# Patient Record
Sex: MALE | Race: WHITE | NOT HISPANIC OR LATINO | Employment: OTHER | URBAN - METROPOLITAN AREA
[De-identification: names, ages, dates, MRNs, and addresses within clinical notes are randomized per-mention and may not be internally consistent; named-entity substitution may affect disease eponyms.]

---

## 2019-01-02 ENCOUNTER — OFFICE VISIT CONVERTED (OUTPATIENT)
Dept: FAMILY MEDICINE CLINIC | Facility: CLINIC | Age: 59
End: 2019-01-02
Attending: FAMILY MEDICINE

## 2019-09-11 ENCOUNTER — CONVERSION ENCOUNTER (OUTPATIENT)
Dept: FAMILY MEDICINE CLINIC | Facility: CLINIC | Age: 59
End: 2019-09-11

## 2019-09-11 ENCOUNTER — OFFICE VISIT CONVERTED (OUTPATIENT)
Dept: FAMILY MEDICINE CLINIC | Facility: CLINIC | Age: 59
End: 2019-09-11
Attending: FAMILY MEDICINE

## 2019-09-12 ENCOUNTER — HOSPITAL ENCOUNTER (OUTPATIENT)
Dept: FAMILY MEDICINE CLINIC | Facility: CLINIC | Age: 59
Discharge: HOME | End: 2019-09-12
Attending: FAMILY MEDICINE

## 2019-09-12 LAB
ALBUMIN SERPL-MCNC: 4.7 G/DL (ref 3.5–5)
ALBUMIN/GLOB SERPL: 1.9 {RATIO} (ref 1.4–2.6)
ALP SERPL-CCNC: 86 U/L (ref 56–119)
ALT SERPL-CCNC: 46 U/L (ref 10–40)
ANION GAP SERPL CALC-SCNC: 24 MMOL/L (ref 8–19)
AST SERPL-CCNC: 27 U/L (ref 15–50)
BASOPHILS # BLD AUTO: 0.02 10*3/UL (ref 0–0.2)
BASOPHILS NFR BLD AUTO: 0.4 % (ref 0–3)
BILIRUB SERPL-MCNC: 0.45 MG/DL (ref 0.2–1.3)
BUN SERPL-MCNC: 17 MG/DL (ref 5–25)
BUN/CREAT SERPL: 18 {RATIO} (ref 6–20)
CALCIUM SERPL-MCNC: 9.9 MG/DL (ref 8.7–10.4)
CHLORIDE SERPL-SCNC: 106 MMOL/L (ref 99–111)
CHOLEST SERPL-MCNC: 112 MG/DL (ref 107–200)
CHOLEST/HDLC SERPL: 2.9 {RATIO} (ref 3–6)
CONV ABS IMM GRAN: 0.01 10*3/UL (ref 0–0.2)
CONV CO2: 17 MMOL/L (ref 22–32)
CONV IMMATURE GRAN: 0.2 % (ref 0–1.8)
CONV TOTAL PROTEIN: 7.2 G/DL (ref 6.3–8.2)
CREAT UR-MCNC: 0.94 MG/DL (ref 0.7–1.2)
DEPRECATED RDW RBC AUTO: 42.4 FL (ref 35.1–43.9)
EOSINOPHIL # BLD AUTO: 0.11 10*3/UL (ref 0–0.7)
EOSINOPHIL # BLD AUTO: 2 % (ref 0–7)
ERYTHROCYTE [DISTWIDTH] IN BLOOD BY AUTOMATED COUNT: 12.8 % (ref 11.6–14.4)
EST. AVERAGE GLUCOSE BLD GHB EST-MCNC: 111 MG/DL
GFR SERPLBLD BASED ON 1.73 SQ M-ARVRAT: >60 ML/MIN/{1.73_M2}
GLOBULIN UR ELPH-MCNC: 2.5 G/DL (ref 2–3.5)
GLUCOSE SERPL-MCNC: 85 MG/DL (ref 70–99)
HBA1C MFR BLD: 5.5 % (ref 3.5–5.7)
HCT VFR BLD AUTO: 39.1 % (ref 42–52)
HDLC SERPL-MCNC: 38 MG/DL (ref 40–60)
HGB BLD-MCNC: 13.5 G/DL (ref 14–18)
LDLC SERPL CALC-MCNC: 56 MG/DL (ref 70–100)
LYMPHOCYTES # BLD AUTO: 2.07 10*3/UL (ref 1–5)
LYMPHOCYTES NFR BLD AUTO: 37 % (ref 20–45)
MCH RBC QN AUTO: 31.2 PG (ref 27–31)
MCHC RBC AUTO-ENTMCNC: 34.5 G/DL (ref 33–37)
MCV RBC AUTO: 90.3 FL (ref 80–96)
MONOCYTES # BLD AUTO: 0.63 10*3/UL (ref 0.2–1.2)
MONOCYTES NFR BLD AUTO: 11.3 % (ref 3–10)
NEUTROPHILS # BLD AUTO: 2.75 10*3/UL (ref 2–8)
NEUTROPHILS NFR BLD AUTO: 49.1 % (ref 30–85)
NRBC CBCN: 0 % (ref 0–0.7)
OSMOLALITY SERPL CALC.SUM OF ELEC: 297 MOSM/KG (ref 273–304)
PLATELET # BLD AUTO: 258 10*3/UL (ref 130–400)
PMV BLD AUTO: 10.8 FL (ref 9.4–12.4)
POTASSIUM SERPL-SCNC: 4 MMOL/L (ref 3.5–5.3)
PSA SERPL-MCNC: 2.81 NG/ML (ref 0–4)
RBC # BLD AUTO: 4.33 10*6/UL (ref 4.7–6.1)
SODIUM SERPL-SCNC: 143 MMOL/L (ref 135–147)
TRIGL SERPL-MCNC: 92 MG/DL (ref 40–150)
VLDLC SERPL-MCNC: 18 MG/DL (ref 5–37)
WBC # BLD AUTO: 5.59 10*3/UL (ref 4.8–10.8)

## 2019-09-27 ENCOUNTER — CONVERSION ENCOUNTER (OUTPATIENT)
Dept: GASTROENTEROLOGY | Facility: CLINIC | Age: 59
End: 2019-09-27
Attending: INTERNAL MEDICINE

## 2019-10-22 ENCOUNTER — HOSPITAL ENCOUNTER (OUTPATIENT)
Dept: GASTROENTEROLOGY | Facility: HOSPITAL | Age: 59
Setting detail: HOSPITAL OUTPATIENT SURGERY
Discharge: HOME | End: 2019-10-22
Attending: INTERNAL MEDICINE

## 2019-11-06 ENCOUNTER — OFFICE VISIT CONVERTED (OUTPATIENT)
Dept: OTOLARYNGOLOGY | Facility: CLINIC | Age: 59
End: 2019-11-06
Attending: OTOLARYNGOLOGY

## 2019-12-13 ENCOUNTER — HOSPITAL ENCOUNTER (OUTPATIENT)
Dept: SURGERY | Facility: HOSPITAL | Age: 59
Setting detail: HOSPITAL OUTPATIENT SURGERY
Discharge: HOME | End: 2019-12-13
Attending: OTOLARYNGOLOGY

## 2019-12-20 ENCOUNTER — OFFICE VISIT CONVERTED (OUTPATIENT)
Dept: OTOLARYNGOLOGY | Facility: CLINIC | Age: 59
End: 2019-12-20
Attending: OTOLARYNGOLOGY

## 2019-12-20 ENCOUNTER — CONVERSION ENCOUNTER (OUTPATIENT)
Dept: OTHER | Facility: HOSPITAL | Age: 59
End: 2019-12-20

## 2020-02-14 ENCOUNTER — OFFICE VISIT CONVERTED (OUTPATIENT)
Dept: OTOLARYNGOLOGY | Facility: CLINIC | Age: 60
End: 2020-02-14
Attending: OTOLARYNGOLOGY

## 2020-03-11 ENCOUNTER — OFFICE VISIT CONVERTED (OUTPATIENT)
Dept: FAMILY MEDICINE CLINIC | Facility: CLINIC | Age: 60
End: 2020-03-11
Attending: FAMILY MEDICINE

## 2020-03-11 ENCOUNTER — CONVERSION ENCOUNTER (OUTPATIENT)
Dept: FAMILY MEDICINE CLINIC | Facility: CLINIC | Age: 60
End: 2020-03-11

## 2020-09-14 ENCOUNTER — HOSPITAL ENCOUNTER (OUTPATIENT)
Dept: FAMILY MEDICINE CLINIC | Facility: CLINIC | Age: 60
Discharge: HOME | End: 2020-09-14
Attending: FAMILY MEDICINE

## 2020-09-14 ENCOUNTER — OFFICE VISIT CONVERTED (OUTPATIENT)
Dept: FAMILY MEDICINE CLINIC | Facility: CLINIC | Age: 60
End: 2020-09-14
Attending: FAMILY MEDICINE

## 2020-09-14 ENCOUNTER — CONVERSION ENCOUNTER (OUTPATIENT)
Dept: FAMILY MEDICINE CLINIC | Facility: CLINIC | Age: 60
End: 2020-09-14

## 2020-09-14 LAB
ALBUMIN SERPL-MCNC: 4.8 G/DL (ref 3.5–5)
ALBUMIN/GLOB SERPL: 2 {RATIO} (ref 1.4–2.6)
ALP SERPL-CCNC: 76 U/L (ref 56–155)
ALT SERPL-CCNC: 24 U/L (ref 10–40)
ANION GAP SERPL CALC-SCNC: 20 MMOL/L (ref 8–19)
AST SERPL-CCNC: 17 U/L (ref 15–50)
BASOPHILS # BLD AUTO: 0.01 10*3/UL (ref 0–0.2)
BASOPHILS NFR BLD AUTO: 0.2 % (ref 0–3)
BILIRUB SERPL-MCNC: 0.47 MG/DL (ref 0.2–1.3)
BUN SERPL-MCNC: 12 MG/DL (ref 5–25)
BUN/CREAT SERPL: 13 {RATIO} (ref 6–20)
CALCIUM SERPL-MCNC: 9.9 MG/DL (ref 8.7–10.4)
CHLORIDE SERPL-SCNC: 104 MMOL/L (ref 99–111)
CHOLEST SERPL-MCNC: 141 MG/DL (ref 107–200)
CHOLEST/HDLC SERPL: 2.9 {RATIO} (ref 3–6)
CONV ABS IMM GRAN: 0.02 10*3/UL (ref 0–0.2)
CONV CO2: 21 MMOL/L (ref 22–32)
CONV IMMATURE GRAN: 0.4 % (ref 0–1.8)
CONV TOTAL PROTEIN: 7.2 G/DL (ref 6.3–8.2)
CREAT UR-MCNC: 0.9 MG/DL (ref 0.7–1.2)
DEPRECATED RDW RBC AUTO: 41.8 FL (ref 35.1–43.9)
EOSINOPHIL # BLD AUTO: 0.1 10*3/UL (ref 0–0.7)
EOSINOPHIL # BLD AUTO: 1.8 % (ref 0–7)
ERYTHROCYTE [DISTWIDTH] IN BLOOD BY AUTOMATED COUNT: 12.7 % (ref 11.6–14.4)
FOLATE SERPL-MCNC: 18.9 NG/ML (ref 4.8–20)
GFR SERPLBLD BASED ON 1.73 SQ M-ARVRAT: >60 ML/MIN/{1.73_M2}
GLOBULIN UR ELPH-MCNC: 2.4 G/DL (ref 2–3.5)
GLUCOSE SERPL-MCNC: 130 MG/DL (ref 70–99)
HCT VFR BLD AUTO: 41.1 % (ref 42–52)
HDLC SERPL-MCNC: 48 MG/DL (ref 40–60)
HGB BLD-MCNC: 13.8 G/DL (ref 14–18)
IRON SATN MFR SERPL: 31 % (ref 20–55)
IRON SERPL-MCNC: 115 UG/DL (ref 70–180)
LDLC SERPL CALC-MCNC: 73 MG/DL (ref 70–100)
LYMPHOCYTES # BLD AUTO: 1.72 10*3/UL (ref 1–5)
LYMPHOCYTES NFR BLD AUTO: 31.6 % (ref 20–45)
MCH RBC QN AUTO: 30.6 PG (ref 27–31)
MCHC RBC AUTO-ENTMCNC: 33.6 G/DL (ref 33–37)
MCV RBC AUTO: 91.1 FL (ref 80–96)
MONOCYTES # BLD AUTO: 0.4 10*3/UL (ref 0.2–1.2)
MONOCYTES NFR BLD AUTO: 7.3 % (ref 3–10)
NEUTROPHILS # BLD AUTO: 3.2 10*3/UL (ref 2–8)
NEUTROPHILS NFR BLD AUTO: 58.7 % (ref 30–85)
NRBC CBCN: 0 % (ref 0–0.7)
OSMOLALITY SERPL CALC.SUM OF ELEC: 294 MOSM/KG (ref 273–304)
PLATELET # BLD AUTO: 271 10*3/UL (ref 130–400)
PMV BLD AUTO: 10.3 FL (ref 9.4–12.4)
POTASSIUM SERPL-SCNC: 4.1 MMOL/L (ref 3.5–5.3)
PSA SERPL-MCNC: 2.89 NG/ML (ref 0–4)
RBC # BLD AUTO: 4.51 10*6/UL (ref 4.7–6.1)
SODIUM SERPL-SCNC: 141 MMOL/L (ref 135–147)
TIBC SERPL-MCNC: 376 UG/DL (ref 245–450)
TRANSFERRIN SERPL-MCNC: 263 MG/DL (ref 215–365)
TRIGL SERPL-MCNC: 98 MG/DL (ref 40–150)
VIT B12 SERPL-MCNC: 476 PG/ML (ref 211–911)
VLDLC SERPL-MCNC: 20 MG/DL (ref 5–37)
WBC # BLD AUTO: 5.45 10*3/UL (ref 4.8–10.8)

## 2021-03-15 ENCOUNTER — OFFICE VISIT CONVERTED (OUTPATIENT)
Dept: FAMILY MEDICINE CLINIC | Facility: CLINIC | Age: 61
End: 2021-03-15
Attending: FAMILY MEDICINE

## 2021-03-15 ENCOUNTER — CONVERSION ENCOUNTER (OUTPATIENT)
Dept: FAMILY MEDICINE CLINIC | Facility: CLINIC | Age: 61
End: 2021-03-15

## 2021-03-16 ENCOUNTER — HOSPITAL ENCOUNTER (OUTPATIENT)
Dept: VACCINE CLINIC | Facility: HOSPITAL | Age: 61
Discharge: HOME | End: 2021-03-16
Attending: INTERNAL MEDICINE

## 2021-04-06 ENCOUNTER — HOSPITAL ENCOUNTER (OUTPATIENT)
Dept: VACCINE CLINIC | Facility: HOSPITAL | Age: 61
Discharge: HOME | End: 2021-04-06
Attending: INTERNAL MEDICINE

## 2021-05-13 NOTE — PROGRESS NOTES
Progress Note      Patient Name: Gray Ball   Patient ID: [id number]   Sex: Male   YOB: 1960    Primary Care Provider: Mikey Evans DO   Referring Provider: Mikey Evans DO    Visit Date: September 14, 2020    Provider: Mikey Evans DO   Location: [facility]   Location Address: [address] [place] [address], [address]  [id number]   Location Phone: [phone]          Chief Complaint  · 6 month follow up      History Of Present Illness  Gray Ball is a 59 year old /White male who presents for evaluation and treatment of:      Patient presents for checkup today.  He reports overall doing well.  He does have left hand pain but it is not that bothersome at this time.  It is pain around the thumb area.  He is not interested in any evaluation.  He has had issues with the right before and he went and saw a hand specialist and had this surgically corrected.  He is due for labs.  He has had breakfast this morning.  He does have on occasion elevated LFT.  We will follow-up on this with routine labs.  He has had borderline anemia level so we will check B12, folic acid and iron and TIBC levels.  He is only taking a multivitamin at this time.  He is interested in the flu vaccine today.       Past Medical History  Allergic rhinitis; Anxiety; Arthritis; Broken Bones; Colon cancer screening; Depression; Deviated nasal septum; Ear Pain; Elevated ALT measurement; Hypertriglyceridemia; Hypertrophy of both inferior nasal turbinates; Kidney calculus; Memory loss/Forgetfulness; Migraine Headaches; Nasal crusting; Nasal obstruction; Night sweats; Prostate cancer screening; Psychiatric Care         Past Surgical History  EYE SURGERY; Nasal septoplasty; Rotator Cuff repair; Submucosal resection of both inferior turbinates; Trapeziectomy         Medication List  multivitamin oral tablet         Allergy List  PENICILLINS         Family Medical History  Family history of  "brain cancer; Family history of stroke         Social History  Alcohol (Never); Family History of Substance Use/Abuse; Tobacco (Former)         Immunizations  Name Date Admin   Influenza    Influenza    [id number]          Review of Systems     General: Denies any fever, chills, weight changes, or night sweats  HEENT:  Denies any vision or hearing changes. Denies any neck tenderness. Denies any headaches. Denies nasal congestion  Cardiovascular: Denies any chest pain or palpitations  respiratory: Denies any cough or wheezing. Denies any shortness of breath  Gastrointestinal: Denies any nausea vomiting or diarrhea, Denies constipation  Extremities: Denies any edema  Psychiatric: Denies any changes in mood or affect  Neurologic: Denies any neurologic deficits  skin: Denies any rashes or lesions.  endocrine: Denies any weight loss, fever, night sweats  Musculoskeletal: Left thumb pain       Vitals  Date Time BP Position Site L\R Cuff Size HR RR TEMP (F) WT  HT  BMI kg/m2 BSA m2 O2 Sat HC       09/14/2020 08:05 /66 Sitting    74 - R  97 150lbs 0oz 5'  5\" 24.96 1.77 99 %          Physical Examination     General: AAO 3, no acute distress, pleasant  HEENT: Normocephalic, atraumatic  Cardiovascular: Regular rate and rhythm without appreciable murmur  Respiratory: Clear to auscultation bilaterally no RRW  Gastrointestinal: Soft nontender nondistended with bowel sounds present  extremities: No clubbing, cyanosis or edema  Neurologic: CN II through XII grossly intact   Psychiatric: Normal mood and affect               Assessment  · Anemia     285.9/D64.9  · Need for influenza vaccination     V04.81/Z23  · Left hand pain     729.5/M79.642  · Elevated LFTs     790.6/R79.89    Problems Reconciled  Plan  · Orders  o ACO-39: Current medications updated and reviewed () - - 09/14/2020  o Influenza Vaccine, Fluarix, Quadrivalent, age 6 months and up (57624) - V04.81/Z23 - 09/14/2020   Vaccine - Influenza; Dose: 0.5; Site: " Left Deltoid; Route: Intramuscular; Date: 09/14/2020 08:37:00; Exp: 06/30/2020; Lot: [id number]; Mfg: [employer] pasteur; TradeName: Fluzone Quadrivalent; Administered By: Marie Lacey MA; Comment: Patient tolerated injection well. Left in stable condition.  · Medications  o Medications have been Reconciled  o Transition of Care or Provider Policy  · Instructions  o Handouts were given to patient: Flu vaccine  o Patient was educated/instructed on their diagnosis, treatment and medications prior to discharge from the clinic today.  o Patient instructed to seek medical attention urgently for new or worsening symptoms.  o Call the office with any concerns or questions.  o Plan to monitor at this time. If his left hand pain worsens he is instructed to call or return. I offered an x-ray today however patient declines. I discussed using over-the-counter Voltaren gel. Patient verbalized understanding if he has any other issues or concerns he is instructed to let us know. Labs ordered for today.  · Disposition  o Follow Up in 6 months.            Electronically Signed by: Mikey Evans DO -Author on September 14, 2020 08:51:02 AM

## 2021-05-14 VITALS
SYSTOLIC BLOOD PRESSURE: 106 MMHG | DIASTOLIC BLOOD PRESSURE: 66 MMHG | WEIGHT: 150 LBS | BODY MASS INDEX: 24.99 KG/M2 | HEIGHT: 65 IN | OXYGEN SATURATION: 99 % | HEART RATE: 74 BPM | TEMPERATURE: 97 F

## 2021-05-14 VITALS
WEIGHT: 155 LBS | HEIGHT: 64 IN | DIASTOLIC BLOOD PRESSURE: 78 MMHG | TEMPERATURE: 97.7 F | OXYGEN SATURATION: 100 % | SYSTOLIC BLOOD PRESSURE: 120 MMHG | BODY MASS INDEX: 26.46 KG/M2 | HEART RATE: 66 BPM

## 2021-05-14 NOTE — PROGRESS NOTES
Progress Note      Patient Name: Gray Ball   Patient ID: [id number]   Sex: Male   YOB: 1960    Primary Care Provider: Mikey Evans DO   Referring Provider: Mikey Evans DO    Visit Date: March 15, 2021    Provider: Mikey Evans DO   Location: [facility]   Location Address: [address] [place] [address], [address]  [place], [place]  [id number]   Location Phone: [phone]          Chief Complaint  · check up      History Of Present Illness  Gray Ball is a 60 year old /White male who presents for evaluation and treatment of:      Once today for checkup.  He reports overall doing well.  He is having some left hand pain but describes it as not that bad at this time.  He is having similar issues that he did with the right hand which ultimately required surgery.  He is not interested in further evaluation at this time.  If he changes mind he is instructed to call or return.  Depression screening has been reviewed and it is negative.  His previous labs have been reviewed.  His PSA is unchanged.  Hemoglobin is 13.8.  B12, folic acid and iron levels are adequate.  He is taking a multivitamin.       Past Medical History  Allergic rhinitis; Anxiety; Arthritis; Broken Bones; Colon cancer screening; Depression; Deviated nasal septum; Ear Pain; Elevated ALT measurement; Hypertriglyceridemia; Hypertrophy of both inferior nasal turbinates; Kidney calculus; Memory loss/Forgetfulness; Migraine Headaches; Nasal crusting; Nasal obstruction; Night sweats; Prostate cancer screening; Psychiatric Care         Past Surgical History  EYE SURGERY; Nasal septoplasty; Rotator Cuff repair; Submucosal resection of both inferior turbinates; Trapeziectomy         Medication List  multivitamin oral tablet         Allergy List  PENICILLINS         Family Medical History  Family history of brain cancer; Family history of stroke         Social History  Alcohol (Never); Family History of Substance  "Use/Abuse; Tobacco (Former)         Immunizations  Name Date Admin   Influenza 09/14/2020   Influenza 09/25/2019   [id number] 09/12/2019         Review of Systems     Gen: Denies any fever, chills, or weight changes  Musculoskeletal: Left hand/thumb pain  Extremities: Denies edema  Psychiatric: Depression screening has been reviewed and it is negative.  Neurologic: Denies any deficits  Skin: Denies any rashes       Vitals  Date Time BP Position Site L\R Cuff Size HR RR TEMP (F) WT  HT  BMI kg/m2 BSA m2 O2 Sat FR L/min FiO2 HC       03/15/2021 08:08 /78 Sitting    66 - R  97.7 155lbs 0oz 5'  4\" 26.61 1.78 100 %            Physical Examination     General: AAO 3, no acute distress, pleasant  HEENT: Normocephalic, atraumatic  Cardiovascular: Regular rate and rhythm without appreciable murmur  Respiratory: Clear to auscultation bilaterally no RRW  extremities: No edema  Neurologic: CN II through XII grossly intact   Psychiatric: Normal mood and affect           Assessment  · Anemia     285.9/D64.9  · Screening for depression     V79.0/Z13.89  · Screening for lipid disorders     V77.91/Z13.220  · Screening for prostate cancer     V76.44/Z12.5  · Medication monitoring encounter     V58.83/Z51.81  · Left hand pain     729.5/M79.642      Plan  · Orders  o PSA Ultrasensitive, ANNUAL SCREENING [facility] (19311, ) - V76.44/Z12.5 - 09/15/2021  o CBC with Auto Diff [facility] (20177) - 285.9/D64.9 - 09/15/2021  o CMP [facility] (13180) - V58.83/Z51.81, 285.9/D64.9 - 09/15/2021  o Lipid Panel [facility] (48690) - V77.91/Z13.220 - 09/15/2021  o ACO-18: Negative screen for clinical depression using a standardized tool () - - 03/15/2021   2 points  o ACO-39: Current medications updated and reviewed (, 1159F) - - 03/15/2021  · Medications  o Medications have been Reconciled  o Transition of Care or Provider Policy  · Instructions  o Depression Screen completed and scanned into the EMR under the designated folder within the patient's " documents.  o Today's PHQ-9 result is _2__  o Patient was educated/instructed on their diagnosis, treatment and medications prior to discharge from the clinic today.  o Patient instructed to seek medical attention urgently for new or worsening symptoms.  o Call the office with any concerns or questions.  o Discussed getting labs updated when patient returns in 6 months. He is instructed to call with any questions or concerns. Discussed using Voltaren gel on as-needed basis. Offered a prescription today however patient declines. He states he will get it over-the-counter.  · Disposition  o Follow Up in 6 months.            Electronically Signed by: Mikey Evans DO -Author on March 15, 2021 08:22:44 AM

## 2021-05-15 VITALS
HEIGHT: 65 IN | SYSTOLIC BLOOD PRESSURE: 104 MMHG | HEART RATE: 64 BPM | DIASTOLIC BLOOD PRESSURE: 62 MMHG | BODY MASS INDEX: 25.16 KG/M2 | WEIGHT: 151 LBS | OXYGEN SATURATION: 99 % | TEMPERATURE: 97.5 F

## 2021-05-15 VITALS
OXYGEN SATURATION: 99 % | BODY MASS INDEX: 25.08 KG/M2 | WEIGHT: 150.5 LBS | SYSTOLIC BLOOD PRESSURE: 118 MMHG | HEIGHT: 65 IN | HEART RATE: 87 BPM | TEMPERATURE: 97.8 F | DIASTOLIC BLOOD PRESSURE: 62 MMHG

## 2021-05-15 VITALS
TEMPERATURE: 98.2 F | HEART RATE: 76 BPM | DIASTOLIC BLOOD PRESSURE: 79 MMHG | RESPIRATION RATE: 16 BRPM | SYSTOLIC BLOOD PRESSURE: 113 MMHG | OXYGEN SATURATION: 100 %

## 2021-05-15 VITALS
HEART RATE: 68 BPM | HEIGHT: 65 IN | TEMPERATURE: 97.8 F | WEIGHT: 155.25 LBS | BODY MASS INDEX: 25.87 KG/M2 | RESPIRATION RATE: 15 BRPM | DIASTOLIC BLOOD PRESSURE: 76 MMHG | SYSTOLIC BLOOD PRESSURE: 137 MMHG | OXYGEN SATURATION: 100 %

## 2021-05-15 VITALS
HEART RATE: 71 BPM | OXYGEN SATURATION: 100 % | SYSTOLIC BLOOD PRESSURE: 137 MMHG | HEIGHT: 65 IN | DIASTOLIC BLOOD PRESSURE: 78 MMHG

## 2021-05-16 VITALS
DIASTOLIC BLOOD PRESSURE: 60 MMHG | OXYGEN SATURATION: 99 % | BODY MASS INDEX: 30.58 KG/M2 | HEIGHT: 64 IN | HEART RATE: 86 BPM | SYSTOLIC BLOOD PRESSURE: 112 MMHG | TEMPERATURE: 98.9 F | WEIGHT: 179.12 LBS

## 2021-09-15 ENCOUNTER — OFFICE VISIT (OUTPATIENT)
Dept: FAMILY MEDICINE CLINIC | Facility: CLINIC | Age: 61
End: 2021-09-15

## 2021-09-15 VITALS
HEIGHT: 64 IN | HEART RATE: 70 BPM | OXYGEN SATURATION: 97 % | SYSTOLIC BLOOD PRESSURE: 108 MMHG | WEIGHT: 144.5 LBS | BODY MASS INDEX: 24.67 KG/M2 | DIASTOLIC BLOOD PRESSURE: 82 MMHG | TEMPERATURE: 97.7 F

## 2021-09-15 DIAGNOSIS — Z51.81 MEDICATION MONITORING ENCOUNTER: ICD-10-CM

## 2021-09-15 DIAGNOSIS — D64.9 ANEMIA, UNSPECIFIED TYPE: ICD-10-CM

## 2021-09-15 DIAGNOSIS — R79.89 LOW SERUM LOW DENSITY LIPOPROTEIN (LDL): ICD-10-CM

## 2021-09-15 DIAGNOSIS — R53.83 OTHER FATIGUE: Primary | ICD-10-CM

## 2021-09-15 DIAGNOSIS — R73.09 ABNORMAL GLUCOSE: ICD-10-CM

## 2021-09-15 DIAGNOSIS — R79.89 LOW TESTOSTERONE: ICD-10-CM

## 2021-09-15 DIAGNOSIS — Z13.220 SCREENING FOR LIPID DISORDERS: ICD-10-CM

## 2021-09-15 DIAGNOSIS — Z12.5 SCREENING FOR PROSTATE CANCER: ICD-10-CM

## 2021-09-15 LAB
ALBUMIN SERPL-MCNC: 4.9 G/DL (ref 3.5–5.2)
ALBUMIN/GLOB SERPL: 2 G/DL
ALP SERPL-CCNC: 85 U/L (ref 39–117)
ALT SERPL W P-5'-P-CCNC: 27 U/L (ref 1–41)
ANION GAP SERPL CALCULATED.3IONS-SCNC: 12.3 MMOL/L (ref 5–15)
AST SERPL-CCNC: 18 U/L (ref 1–40)
BASOPHILS # BLD AUTO: 0.02 10*3/MM3 (ref 0–0.2)
BASOPHILS NFR BLD AUTO: 0.4 % (ref 0–1.5)
BILIRUB SERPL-MCNC: 0.4 MG/DL (ref 0–1.2)
BUN SERPL-MCNC: 12 MG/DL (ref 8–23)
BUN/CREAT SERPL: 11.5 (ref 7–25)
CALCIUM SPEC-SCNC: 9.8 MG/DL (ref 8.6–10.5)
CHLORIDE SERPL-SCNC: 102 MMOL/L (ref 98–107)
CHOLEST SERPL-MCNC: 135 MG/DL (ref 0–200)
CO2 SERPL-SCNC: 23.7 MMOL/L (ref 22–29)
CREAT SERPL-MCNC: 1.04 MG/DL (ref 0.76–1.27)
DEPRECATED RDW RBC AUTO: 41.6 FL (ref 37–54)
EOSINOPHIL # BLD AUTO: 0.07 10*3/MM3 (ref 0–0.4)
EOSINOPHIL NFR BLD AUTO: 1.3 % (ref 0.3–6.2)
ERYTHROCYTE [DISTWIDTH] IN BLOOD BY AUTOMATED COUNT: 12.9 % (ref 12.3–15.4)
GFR SERPL CREATININE-BSD FRML MDRD: 72 ML/MIN/1.73
GLOBULIN UR ELPH-MCNC: 2.5 GM/DL
GLUCOSE SERPL-MCNC: 95 MG/DL (ref 65–99)
HBA1C MFR BLD: 5.33 % (ref 4.8–5.6)
HCT VFR BLD AUTO: 41.2 % (ref 37.5–51)
HDLC SERPL-MCNC: 50 MG/DL (ref 40–60)
HGB BLD-MCNC: 14 G/DL (ref 13–17.7)
IMM GRANULOCYTES # BLD AUTO: 0.01 10*3/MM3 (ref 0–0.05)
IMM GRANULOCYTES NFR BLD AUTO: 0.2 % (ref 0–0.5)
LDLC SERPL CALC-MCNC: 73 MG/DL (ref 0–100)
LDLC/HDLC SERPL: 1.48 {RATIO}
LYMPHOCYTES # BLD AUTO: 1.8 10*3/MM3 (ref 0.7–3.1)
LYMPHOCYTES NFR BLD AUTO: 32.6 % (ref 19.6–45.3)
MCH RBC QN AUTO: 30.4 PG (ref 26.6–33)
MCHC RBC AUTO-ENTMCNC: 34 G/DL (ref 31.5–35.7)
MCV RBC AUTO: 89.4 FL (ref 79–97)
MONOCYTES # BLD AUTO: 0.54 10*3/MM3 (ref 0.1–0.9)
MONOCYTES NFR BLD AUTO: 9.8 % (ref 5–12)
NEUTROPHILS NFR BLD AUTO: 3.08 10*3/MM3 (ref 1.7–7)
NEUTROPHILS NFR BLD AUTO: 55.7 % (ref 42.7–76)
NRBC BLD AUTO-RTO: 0 /100 WBC (ref 0–0.2)
PLATELET # BLD AUTO: 260 10*3/MM3 (ref 140–450)
PMV BLD AUTO: 10 FL (ref 6–12)
POTASSIUM SERPL-SCNC: 3.7 MMOL/L (ref 3.5–5.2)
PROT SERPL-MCNC: 7.4 G/DL (ref 6–8.5)
PSA SERPL-MCNC: 3.53 NG/ML (ref 0–4)
RBC # BLD AUTO: 4.61 10*6/MM3 (ref 4.14–5.8)
SODIUM SERPL-SCNC: 138 MMOL/L (ref 136–145)
T4 FREE SERPL-MCNC: 1.25 NG/DL (ref 0.93–1.7)
TESTOST SERPL-MCNC: 780 NG/DL (ref 193–740)
TRIGL SERPL-MCNC: 54 MG/DL (ref 0–150)
TSH SERPL DL<=0.05 MIU/L-ACNC: 2.45 UIU/ML (ref 0.27–4.2)
VLDLC SERPL-MCNC: 12 MG/DL (ref 5–40)
WBC # BLD AUTO: 5.52 10*3/MM3 (ref 3.4–10.8)

## 2021-09-15 PROCEDURE — 84403 ASSAY OF TOTAL TESTOSTERONE: CPT | Performed by: FAMILY MEDICINE

## 2021-09-15 PROCEDURE — 80061 LIPID PANEL: CPT | Performed by: FAMILY MEDICINE

## 2021-09-15 PROCEDURE — 83036 HEMOGLOBIN GLYCOSYLATED A1C: CPT | Performed by: FAMILY MEDICINE

## 2021-09-15 PROCEDURE — 99213 OFFICE O/P EST LOW 20 MIN: CPT | Performed by: FAMILY MEDICINE

## 2021-09-15 PROCEDURE — 84439 ASSAY OF FREE THYROXINE: CPT | Performed by: FAMILY MEDICINE

## 2021-09-15 PROCEDURE — G0103 PSA SCREENING: HCPCS | Performed by: FAMILY MEDICINE

## 2021-09-15 PROCEDURE — 84443 ASSAY THYROID STIM HORMONE: CPT | Performed by: FAMILY MEDICINE

## 2021-09-15 PROCEDURE — 85025 COMPLETE CBC W/AUTO DIFF WBC: CPT | Performed by: FAMILY MEDICINE

## 2021-09-15 PROCEDURE — 80053 COMPREHEN METABOLIC PANEL: CPT | Performed by: FAMILY MEDICINE

## 2021-09-15 RX ORDER — AMOXICILLIN 500 MG/1
CAPSULE ORAL
COMMUNITY
Start: 2021-06-09 | End: 2021-09-15

## 2021-09-15 RX ORDER — MULTIPLE VITAMINS W/ MINERALS TAB 9MG-400MCG
1 TAB ORAL DAILY
COMMUNITY

## 2021-09-15 NOTE — PROGRESS NOTES
"Chief Complaint  Fatigue  Low sex drive  Requesting labs    Subjective          Gray Ball presents to [facility]  History of Present Illness  Patient presents today to discuss issues with fatigue and low sex drive.  He reports he is intimate with his wife about once a week which has been more than usual.  He is concerned about low testosterone levels.  I discussed getting these checked for him.  He also is due for routine labs including PSA screening.  He reports overall doing well.  He was previously noted to have some anemia however his last lab work showed that his hemoglobin was 13.8.  He had previously noted normal iron, B12, and folic acid levels.  He was previously noted to have an abnormal glucose level.  We discussed checking an A1c.  I will also check a thyroid panel given issues with fatigue.  Objective   Vital Signs:   /82   Pulse 70   Temp 97.7 °F (36.5 °C)   Ht 162.6 cm (64\")   Wt 65.5 kg (144 lb 8 oz)   SpO2 97%   BMI 24.80 kg/m²     Physical Exam   General: AAO ×3, no acute distress, pleasant  HEENT: Normocephalic, atraumatic  Cardiovascular: Regular rate and rhythm without appreciable murmur  Respiratory: Clear to auscultation bilaterally no RRW  Gastrointestinal: Soft nontender nondistended with bowel sounds present  extremities: No edema  Neurologic: CN II through XII grossly intact   Psychiatric: Normal mood and affect  Result Review :                 Assessment and Plan    Diagnoses and all orders for this visit:    1. Other fatigue (Primary)  -     Testosterone  -     TSH+Free T4    2. Screening for prostate cancer  -     PSA Screen    3. Anemia, unspecified type  -     Comprehensive Metabolic Panel  -     CBC & Differential    4. Abnormal glucose  -     Hemoglobin A1c    5. Screening for lipid disorders  -     Lipid Panel    6. Low testosterone  -     Testosterone  -     PSA Screen    7. Medication monitoring encounter  -     Hemoglobin A1c  - "     Comprehensive Metabolic Panel  -     CBC & Differential  -     Testosterone  -     Lipid Panel  -     PSA Screen  -     TSH+Free T4    8. Low serum low density lipoprotein (LDL)  -     Lipid Panel    Plan as documented above.  Discussed checking labs today.  I will see patient back in 6 months or sooner if needed.  Depending on testosterone level result we may order further labs to further evaluate including repeating testosterone level if low or borderline low.  Patient verbalized understanding and is in agreement with treatment and management plan.    Follow Up   Return in about 6 months (around 3/15/2022) for fatigue.  Patient was given instructions and counseling regarding his condition or for health maintenance advice. Please see specific information pulled into the AVS if appropriate.

## 2022-03-15 ENCOUNTER — OFFICE VISIT (OUTPATIENT)
Dept: FAMILY MEDICINE CLINIC | Facility: CLINIC | Age: 62
End: 2022-03-15

## 2022-03-15 VITALS
SYSTOLIC BLOOD PRESSURE: 128 MMHG | HEART RATE: 70 BPM | HEIGHT: 65 IN | DIASTOLIC BLOOD PRESSURE: 80 MMHG | TEMPERATURE: 98.1 F | OXYGEN SATURATION: 100 % | BODY MASS INDEX: 24.71 KG/M2 | WEIGHT: 148.3 LBS

## 2022-03-15 DIAGNOSIS — R73.09 ABNORMAL GLUCOSE: ICD-10-CM

## 2022-03-15 DIAGNOSIS — K63.5 POLYP OF COLON, UNSPECIFIED PART OF COLON, UNSPECIFIED TYPE: ICD-10-CM

## 2022-03-15 DIAGNOSIS — M54.50 CHRONIC BILATERAL LOW BACK PAIN WITHOUT SCIATICA: Primary | ICD-10-CM

## 2022-03-15 DIAGNOSIS — G89.29 CHRONIC BILATERAL LOW BACK PAIN WITHOUT SCIATICA: Primary | ICD-10-CM

## 2022-03-15 DIAGNOSIS — D64.9 ANEMIA, UNSPECIFIED TYPE: ICD-10-CM

## 2022-03-15 DIAGNOSIS — Z12.5 SCREENING FOR PROSTATE CANCER: ICD-10-CM

## 2022-03-15 DIAGNOSIS — Z51.81 MEDICATION MONITORING ENCOUNTER: ICD-10-CM

## 2022-03-15 DIAGNOSIS — K64.9 HEMORRHOIDS, UNSPECIFIED HEMORRHOID TYPE: ICD-10-CM

## 2022-03-15 PROCEDURE — 99213 OFFICE O/P EST LOW 20 MIN: CPT | Performed by: FAMILY MEDICINE

## 2022-03-15 NOTE — PROGRESS NOTES
"Chief Complaint  Back Pain (low)    Subjective          Gray Ball presents to [facility]  History of Present Illness  Patient presents today to discuss low back pain.  He has this issue on and off.  Has been gone for the past couple weeks.  He states there is nothing that he cannot live with but since he is here he would like to discuss it.  He is also had some issues with hemorrhoids.  States that most days he does not notice it.  He was previously noted on his colonoscopy to have hemorrhoids.  His colonoscopy was done back in October 2019 with Dr. Adams.  Recommended follow-up was 3 to 5 years depending on results.  Plan to have our office contact their office to find out when GI would like to have this done again.  Path report came back with benign colon polyps in the transverse colon and rectal area.  He reports having some upper back numbness from several years ago after having a sunburn.  He states that it does not feel completely numb just feels like the skin is a little bit \"dead end\".  It does not bother him that much however he would like to have his skin checked in this area since he does not feel very well in this area.  He can also not see this area.  I did review his labs from last time. His thyroid levels are normal.  His PSA level was normal at 3.53.  His lipid panel was normal.  He did have a slightly elevated testosterone level at 780 with the upper limits being 740.  CBC showed no anemia.  He previously had slight anemia.  He has previously had elevated glucose levels but his most recent levels returned to his normal.  His A1c was also normal at 5.33.  Objective   Vital Signs:   /80   Pulse 70   Temp 98.1 °F (36.7 °C)   Ht 165.1 cm (65\")   Wt 67.3 kg (148 lb 4.8 oz)   SpO2 100%   BMI 24.68 kg/m²     Physical Exam   General: AAO ×3, no acute distress, pleasant  HEENT: Normocephalic, atraumatic  Musculoskeletal: Paraspinal hypertonicity of the " lumbar spine.  Nontender to palpation.  Skin: No concerning skin lesion appreciated in patient's posterior upper back area.  He has a benign-appearing cherry angioma on the right upper back area.  Cardiovascular: Regular rate and rhythm without appreciable murmur  Respiratory: Clear to auscultation bilaterally no RRW  Gastrointestinal: Soft nontender nondistended with bowel sounds present  extremities: No edema  Neurologic: CN II through XII grossly intact   Psychiatric: Normal mood and affect  Result Review :                 Assessment and Plan    Diagnoses and all orders for this visit:    1. Chronic bilateral low back pain without sciatica (Primary)    2. Hemorrhoids, unspecified hemorrhoid type    3. Screening for prostate cancer  -     PSA Screen; Future    4. Medication monitoring encounter  -     CBC & Differential; Future  -     Comprehensive Metabolic Panel; Future    5. Abnormal glucose  -     Comprehensive Metabolic Panel; Future  -     Hemoglobin A1c; Future    6. Anemia, unspecified type  -     CBC & Differential; Future    7. Polyp of colon, unspecified part of colon, unspecified type    As far as his low back is concerned he is not interested in any aggressive treatments at this time.  I discussed with him regular stretching and core strengthening exercises.  He declines any exam for his hemorrhoids today.  I discussed with him using over-the-counter Preparation H as well as staying well-hydrated and keeping a high-fiber diet.  Plan on having his labs repeated prior to next appointment.  Patient instructed to call with any questions or concerns.    Follow Up   Return in about 6 months (around 9/15/2022) for low back pain.  Patient was given instructions and counseling regarding his condition or for health maintenance advice. Please see specific information pulled into the AVS if appropriate.

## 2022-03-23 ENCOUNTER — TELEPHONE (OUTPATIENT)
Dept: GASTROENTEROLOGY | Facility: CLINIC | Age: 62
End: 2022-03-23

## 2022-03-23 NOTE — PROGRESS NOTES
Phone call placed to Dr Ríos's  office requesting information regarding need for another colonscopy and time frame. Message left with office to return call.

## 2022-03-23 NOTE — PROGRESS NOTES
Phone call received back from Dr Ríos's stating that the recall for another colonoscopy to be done by October 15, 2024

## 2022-03-23 NOTE — TELEPHONE ENCOUNTER
Patient pcp office called and asked when patient was due for a colonoscopy. They were given recall for 10/2024

## 2022-07-21 ENCOUNTER — IMMUNIZATION (OUTPATIENT)
Dept: FAMILY MEDICINE CLINIC | Facility: CLINIC | Age: 62
End: 2022-07-21

## 2022-07-21 DIAGNOSIS — Z23 NEED FOR COVID-19 VACCINE: Primary | ICD-10-CM

## 2022-07-21 PROCEDURE — 91305 COVID-19 (PFIZER) 12+ YRS: CPT | Performed by: FAMILY MEDICINE

## 2022-07-21 PROCEDURE — 0054A COVID-19 (PFIZER) 12+ YRS: CPT | Performed by: FAMILY MEDICINE

## 2022-09-12 ENCOUNTER — OFFICE VISIT (OUTPATIENT)
Dept: FAMILY MEDICINE CLINIC | Facility: CLINIC | Age: 62
End: 2022-09-12

## 2022-09-12 VITALS
SYSTOLIC BLOOD PRESSURE: 118 MMHG | TEMPERATURE: 97.9 F | WEIGHT: 146 LBS | OXYGEN SATURATION: 100 % | BODY MASS INDEX: 24.92 KG/M2 | HEART RATE: 67 BPM | DIASTOLIC BLOOD PRESSURE: 70 MMHG | HEIGHT: 64 IN

## 2022-09-12 DIAGNOSIS — Z12.5 SCREENING FOR PROSTATE CANCER: ICD-10-CM

## 2022-09-12 DIAGNOSIS — M79.645 CHRONIC PAIN OF LEFT THUMB: Primary | ICD-10-CM

## 2022-09-12 DIAGNOSIS — R73.09 ABNORMAL GLUCOSE: ICD-10-CM

## 2022-09-12 DIAGNOSIS — Z51.81 MEDICATION MONITORING ENCOUNTER: ICD-10-CM

## 2022-09-12 DIAGNOSIS — G89.29 CHRONIC PAIN OF LEFT THUMB: Primary | ICD-10-CM

## 2022-09-12 DIAGNOSIS — D64.9 ANEMIA, UNSPECIFIED TYPE: ICD-10-CM

## 2022-09-12 PROBLEM — Z12.11 COLON CANCER SCREENING: Status: ACTIVE | Noted: 2022-09-12

## 2022-09-12 LAB
ALBUMIN SERPL-MCNC: 4.7 G/DL (ref 3.5–5.2)
ALBUMIN/GLOB SERPL: 2 G/DL
ALP SERPL-CCNC: 68 U/L (ref 39–117)
ALT SERPL W P-5'-P-CCNC: 28 U/L (ref 1–41)
ANION GAP SERPL CALCULATED.3IONS-SCNC: 9.8 MMOL/L (ref 5–15)
AST SERPL-CCNC: 16 U/L (ref 1–40)
BASOPHILS # BLD AUTO: 0.03 10*3/MM3 (ref 0–0.2)
BASOPHILS NFR BLD AUTO: 0.6 % (ref 0–1.5)
BILIRUB SERPL-MCNC: 0.5 MG/DL (ref 0–1.2)
BUN SERPL-MCNC: 15 MG/DL (ref 8–23)
BUN/CREAT SERPL: 15.6 (ref 7–25)
CALCIUM SPEC-SCNC: 10.2 MG/DL (ref 8.6–10.5)
CHLORIDE SERPL-SCNC: 105 MMOL/L (ref 98–107)
CO2 SERPL-SCNC: 22.2 MMOL/L (ref 22–29)
CREAT SERPL-MCNC: 0.96 MG/DL (ref 0.76–1.27)
DEPRECATED RDW RBC AUTO: 43.4 FL (ref 37–54)
EGFRCR SERPLBLD CKD-EPI 2021: 88.8 ML/MIN/1.73
EOSINOPHIL # BLD AUTO: 0.09 10*3/MM3 (ref 0–0.4)
EOSINOPHIL NFR BLD AUTO: 1.7 % (ref 0.3–6.2)
ERYTHROCYTE [DISTWIDTH] IN BLOOD BY AUTOMATED COUNT: 13 % (ref 12.3–15.4)
GLOBULIN UR ELPH-MCNC: 2.4 GM/DL
GLUCOSE SERPL-MCNC: 98 MG/DL (ref 65–99)
HBA1C MFR BLD: 5.6 % (ref 4.8–5.6)
HCT VFR BLD AUTO: 42 % (ref 37.5–51)
HGB BLD-MCNC: 14.1 G/DL (ref 13–17.7)
IMM GRANULOCYTES # BLD AUTO: 0.01 10*3/MM3 (ref 0–0.05)
IMM GRANULOCYTES NFR BLD AUTO: 0.2 % (ref 0–0.5)
LYMPHOCYTES # BLD AUTO: 1.9 10*3/MM3 (ref 0.7–3.1)
LYMPHOCYTES NFR BLD AUTO: 36.7 % (ref 19.6–45.3)
MCH RBC QN AUTO: 30.8 PG (ref 26.6–33)
MCHC RBC AUTO-ENTMCNC: 33.6 G/DL (ref 31.5–35.7)
MCV RBC AUTO: 91.7 FL (ref 79–97)
MONOCYTES # BLD AUTO: 0.55 10*3/MM3 (ref 0.1–0.9)
MONOCYTES NFR BLD AUTO: 10.6 % (ref 5–12)
NEUTROPHILS NFR BLD AUTO: 2.6 10*3/MM3 (ref 1.7–7)
NEUTROPHILS NFR BLD AUTO: 50.2 % (ref 42.7–76)
NRBC BLD AUTO-RTO: 0 /100 WBC (ref 0–0.2)
PLATELET # BLD AUTO: 273 10*3/MM3 (ref 140–450)
PMV BLD AUTO: 10.2 FL (ref 6–12)
POTASSIUM SERPL-SCNC: 4.5 MMOL/L (ref 3.5–5.2)
PROT SERPL-MCNC: 7.1 G/DL (ref 6–8.5)
PSA SERPL-MCNC: 3.95 NG/ML (ref 0–4)
RBC # BLD AUTO: 4.58 10*6/MM3 (ref 4.14–5.8)
SODIUM SERPL-SCNC: 137 MMOL/L (ref 136–145)
WBC NRBC COR # BLD: 5.18 10*3/MM3 (ref 3.4–10.8)

## 2022-09-12 PROCEDURE — G0103 PSA SCREENING: HCPCS | Performed by: FAMILY MEDICINE

## 2022-09-12 PROCEDURE — 83036 HEMOGLOBIN GLYCOSYLATED A1C: CPT | Performed by: FAMILY MEDICINE

## 2022-09-12 PROCEDURE — 99213 OFFICE O/P EST LOW 20 MIN: CPT | Performed by: FAMILY MEDICINE

## 2022-09-12 PROCEDURE — 85025 COMPLETE CBC W/AUTO DIFF WBC: CPT | Performed by: FAMILY MEDICINE

## 2022-09-12 PROCEDURE — 80053 COMPREHEN METABOLIC PANEL: CPT | Performed by: FAMILY MEDICINE

## 2022-09-12 NOTE — PROGRESS NOTES
"Chief Complaint  Left hand pain    Subjective        Gray Ball presents to [facility]  History of Present Illness  Patient presents today to discuss left hand pain.  Specifically, he points to the first CMC joint of the left hand.  He has previously had surgery for the right hand as this was a side that was more symptomatic.  He has not had an x-ray of his left hand.  We discussed getting this done.  He has used Voltaren gel which has helped out but symptoms are not that severe but he would like to look into it more.  Previously noted to have anemia however most recently his hemoglobin was within normal limits.  He is due for routine labs including screening for prostate cancer.  He is not currently taking any medications other than a multivitamin.  Objective   Vital Signs:  /70   Pulse 67   Temp 97.9 °F (36.6 °C)   Ht 162.6 cm (64\")   Wt 66.2 kg (146 lb)   SpO2 100%   BMI 25.06 kg/m²   Estimated body mass index is 25.06 kg/m² as calculated from the following:    Height as of this encounter: 162.6 cm (64\").    Weight as of this encounter: 66.2 kg (146 lb).          Physical Exam   General: AAO ×3, no acute distress, pleasant  HEENT: Normocephalic, atraumatic  Musculoskeletal: Patient has pain over the first CMC joint of the left hand.  Range of motion is intact.  Cardiovascular: Regular rate and rhythm without appreciable murmur  Respiratory: Clear to auscultation bilaterally no RRW  Gastrointestinal: Soft nontender nondistended with bowel sounds present  extremities: No edema  Neurologic: CN II through XII grossly intact   Psychiatric: Normal mood and affect  Result Review :                Assessment and Plan   Diagnoses and all orders for this visit:    1. Chronic pain of left thumb (Primary)  -     XR Hand 3+ View Left; Future    2. Screening for prostate cancer  -     PSA Screen    3. Medication monitoring encounter  -     CBC & Differential  -     Comprehensive " Metabolic Panel    4. Abnormal glucose  -     Comprehensive Metabolic Panel  -     Hemoglobin A1c    5. Anemia, unspecified type  -     CBC & Differential    I discussed with patient getting labs drawn today.  We discussed getting x-ray of the left hand for further evaluation.  I suspect this is likely arthritis.  We discussed using Voltaren gel with further recommendations to follow if/as needed.  I did discuss with him options if symptoms persist including corticosteroid injection however he is not interested in this at this time.  Plan to see him back in 6 months or sooner if needed.         Follow Up   Return in about 6 months (around 3/12/2023) for anemia.  Patient was given instructions and counseling regarding his condition or for health maintenance advice. Please see specific information pulled into the AVS if appropriate.

## 2022-09-13 ENCOUNTER — HOSPITAL ENCOUNTER (OUTPATIENT)
Dept: GENERAL RADIOLOGY | Facility: HOSPITAL | Age: 62
Discharge: HOME | End: 2022-09-13
Admitting: FAMILY MEDICINE

## 2022-09-13 DIAGNOSIS — G89.29 CHRONIC PAIN OF LEFT THUMB: ICD-10-CM

## 2022-09-13 DIAGNOSIS — M79.645 CHRONIC PAIN OF LEFT THUMB: ICD-10-CM

## 2022-09-13 DIAGNOSIS — R97.20 RISING PSA LEVEL: Primary | ICD-10-CM

## 2022-09-13 PROCEDURE — 73130 X-RAY EXAM OF HAND: CPT

## 2022-09-16 ENCOUNTER — TELEPHONE (OUTPATIENT)
Dept: FAMILY MEDICINE CLINIC | Facility: CLINIC | Age: 62
End: 2022-09-16

## 2022-09-16 NOTE — TELEPHONE ENCOUNTER
PATIENT IS RETURNING A CALL FROM JUDITH ABOUT LAB RESULTS. PLEASE CALL BACK THE [phone] PHONE NUMBER.

## 2022-10-05 ENCOUNTER — CLINICAL SUPPORT (OUTPATIENT)
Dept: FAMILY MEDICINE CLINIC | Facility: CLINIC | Age: 62
End: 2022-10-05

## 2022-10-05 DIAGNOSIS — Z23 NEED FOR INFLUENZA VACCINATION: Primary | ICD-10-CM

## 2022-10-05 PROCEDURE — G0008 ADMIN INFLUENZA VIRUS VAC: HCPCS | Performed by: FAMILY MEDICINE

## 2022-10-05 PROCEDURE — 90686 IIV4 VACC NO PRSV 0.5 ML IM: CPT | Performed by: FAMILY MEDICINE

## 2023-03-13 ENCOUNTER — OFFICE VISIT (OUTPATIENT)
Dept: FAMILY MEDICINE CLINIC | Facility: CLINIC | Age: 63
End: 2023-03-13
Payer: MEDICARE

## 2023-03-13 VITALS
DIASTOLIC BLOOD PRESSURE: 76 MMHG | TEMPERATURE: 98 F | OXYGEN SATURATION: 100 % | BODY MASS INDEX: 25.16 KG/M2 | WEIGHT: 147.4 LBS | HEART RATE: 67 BPM | HEIGHT: 64 IN | SYSTOLIC BLOOD PRESSURE: 136 MMHG

## 2023-03-13 DIAGNOSIS — R79.89 ELEVATED TESTOSTERONE LEVEL: ICD-10-CM

## 2023-03-13 DIAGNOSIS — M65.332 TRIGGER MIDDLE FINGER OF LEFT HAND: ICD-10-CM

## 2023-03-13 DIAGNOSIS — M26.609 TMJ DYSFUNCTION: Primary | ICD-10-CM

## 2023-03-13 DIAGNOSIS — R97.20 RISING PSA LEVEL: ICD-10-CM

## 2023-03-13 DIAGNOSIS — M18.12 ARTHRITIS OF CARPOMETACARPAL (CMC) JOINT OF LEFT THUMB: ICD-10-CM

## 2023-03-13 PROCEDURE — 99214 OFFICE O/P EST MOD 30 MIN: CPT | Performed by: FAMILY MEDICINE

## 2023-03-13 PROCEDURE — 20600 DRAIN/INJ JOINT/BURSA W/O US: CPT | Performed by: FAMILY MEDICINE

## 2023-03-13 RX ORDER — LIDOCAINE HYDROCHLORIDE 10 MG/ML
0.25 INJECTION, SOLUTION INFILTRATION; PERINEURAL
Status: COMPLETED | OUTPATIENT
Start: 2023-03-13 | End: 2023-03-13

## 2023-03-13 RX ORDER — TRIAMCINOLONE ACETONIDE 40 MG/ML
10 INJECTION, SUSPENSION INTRA-ARTICULAR; INTRAMUSCULAR
Status: COMPLETED | OUTPATIENT
Start: 2023-03-13 | End: 2023-03-13

## 2023-03-13 RX ADMIN — LIDOCAINE HYDROCHLORIDE 0.25 ML: 10 INJECTION, SOLUTION INFILTRATION; PERINEURAL at 09:52

## 2023-03-13 RX ADMIN — TRIAMCINOLONE ACETONIDE 10 MG: 40 INJECTION, SUSPENSION INTRA-ARTICULAR; INTRAMUSCULAR at 09:52

## 2023-03-13 NOTE — PROGRESS NOTES
"Chief Complaint  Left hand pain    Subjective        Gray Ball presents to [facility]  History of Present Illness  Patient presents today to discuss ongoing issues with left hand pain.  He did have a x-ray done of the left hand showing osteoarthritis of the first CMC joint as well as ulnar triquetral impaction.  This is per report.  He points to the pain over the first CMC joint.  He has tried Voltaren but is still having pain.  I did discuss with her options.  He has previously had surgery for first CMC joint arthritis with [facility] in [place].  He does have issues with TMJ on the right side.  He reports that his jaws not align.  He does have an appointment to see a dentist next week.  Does get a small cyst that comes up on the inside of his left cheek but then he will pop it and will go away.  He does not have one today.  I reviewed his previous lab work.  Testosterone level was elevated previously.  I plan to have this lab repeated.  He will return at 8 AM on another day to have this done.  His PSA was at the upper limits of normal at the last time we checked it at 3.950.  He is due to have another PSA checked and will return to have this done.  His third digit of the left hand will sometimes get stuck and then triggered position.  It does not happen all the time.  Objective   Vital Signs:  /76   Pulse 67   Temp 98 °F (36.7 °C)   Ht 162.6 cm (64\")   Wt 66.9 kg (147 lb 6.4 oz)   SpO2 100%   BMI 25.30 kg/m²   Estimated body mass index is 25.3 kg/m² as calculated from the following:    Height as of this encounter: 162.6 cm (64\").    Weight as of this encounter: 66.9 kg (147 lb 6.4 oz).             Physical Exam   General: AAO ×3, no acute distress, pleasant  HEENT: Normocephalic, atraumatic.  No cyst appreciated on oral exam today.  He does have a small cratered area on the inside of his mouth on the left side where the cyst previously was.  " Clicking/crepitus noted with active range of motion of the TMJ bilaterally but more prominent on the right side.  Cardiovascular: Regular rate and rhythm without appreciable murmur  Respiratory: Clear to auscultation bilaterally no RRW  Gastrointestinal: Soft nontender nondistended with bowel sounds present  Musculoskeletal: Tenderness when palpating over the first CMC joint of the left hand.  extremities: No edema  Neurologic: CN II through XII grossly intact   Psychiatric: Normal mood and affect  Result Review :            Arthrocentesis    Date/Time: 3/13/2023 9:52 AM  Performed by: Mikey Evans DO  Authorized by: Mikey Evans DO   Indications: pain   Preparation: Patient was prepped and draped in the usual sterile fashion.  Needle gauge: 25 G.  Meds administered: 0.25 mL lidocaine 1 %; 10 mg triamcinolone acetonide 40 MG/ML  Comments: Patient provided verbal and written consent.  First CMC joint injection given for the left hand.            Assessment and Plan   Diagnoses and all orders for this visit:    1. TMJ dysfunction, right (Primary)    2. Arthritis of carpometacarpal (CMC) joint of left thumb  -     Arthrocentesis    3. Rising PSA level    4. Elevated testosterone level  -     Testosterone; Future    5. Trigger middle finger of left hand      I suspect this is likely a harmless cyst such as an oral mucocele that is occurring on the inside of his mouth.  It does not seem to be causing him any problems.  He will see his dentist coming up soon I encouraged him to discuss it with his dentist as well.  I did offer to send him to an oral surgeon especially with his TMJ issues but he declined at this time.  Injection given for the first CMC joint of the left hand.  Postprocedural instructions were given to the patient.  He is due for labs and will return on another day at 8 AM to have his labs drawn.  As far as the trigger finger is concerned we discussed expectant management at this time.  Should  symptoms worsen we discussed a corticosteroid injection.         Follow Up   Return in about 3 months (around 6/13/2023) for cmc arthritis, left hand.  Patient was given instructions and counseling regarding his condition or for health maintenance advice. Please see specific information pulled into the AVS if appropriate.

## 2023-03-22 ENCOUNTER — LAB (OUTPATIENT)
Dept: FAMILY MEDICINE CLINIC | Facility: CLINIC | Age: 63
End: 2023-03-22
Payer: MEDICARE

## 2023-03-22 DIAGNOSIS — R79.89 ELEVATED TESTOSTERONE LEVEL: ICD-10-CM

## 2023-03-22 DIAGNOSIS — R97.20 RISING PSA LEVEL: ICD-10-CM

## 2023-03-22 DIAGNOSIS — R97.20 ELEVATED PSA: ICD-10-CM

## 2023-03-22 DIAGNOSIS — R79.89 ELEVATED TESTOSTERONE LEVEL: Primary | ICD-10-CM

## 2023-03-22 LAB
PSA SERPL-MCNC: 5.88 NG/ML (ref 0–4)
TESTOST SERPL-MCNC: 905 NG/DL (ref 193–740)

## 2023-03-22 PROCEDURE — 36415 COLL VENOUS BLD VENIPUNCTURE: CPT | Performed by: FAMILY MEDICINE

## 2023-03-22 PROCEDURE — 84153 ASSAY OF PSA TOTAL: CPT | Performed by: FAMILY MEDICINE

## 2023-03-22 PROCEDURE — 84403 ASSAY OF TOTAL TESTOSTERONE: CPT | Performed by: FAMILY MEDICINE

## 2023-04-19 ENCOUNTER — LAB (OUTPATIENT)
Dept: FAMILY MEDICINE CLINIC | Facility: CLINIC | Age: 63
End: 2023-04-19
Payer: MEDICARE

## 2023-04-19 DIAGNOSIS — R79.89 ELEVATED TESTOSTERONE LEVEL: ICD-10-CM

## 2023-04-19 DIAGNOSIS — R97.20 ELEVATED PSA: ICD-10-CM

## 2023-04-19 DIAGNOSIS — R97.20 ELEVATED PSA: Primary | ICD-10-CM

## 2023-04-19 LAB
PSA SERPL-MCNC: 4.82 NG/ML (ref 0–4)
TESTOST SERPL-MCNC: 854 NG/DL (ref 193–740)

## 2023-04-19 PROCEDURE — 36415 COLL VENOUS BLD VENIPUNCTURE: CPT | Performed by: FAMILY MEDICINE

## 2023-04-19 PROCEDURE — 84403 ASSAY OF TOTAL TESTOSTERONE: CPT | Performed by: FAMILY MEDICINE

## 2023-04-19 PROCEDURE — 84153 ASSAY OF PSA TOTAL: CPT | Performed by: FAMILY MEDICINE

## 2023-05-02 NOTE — PROGRESS NOTES
Chief Complaint: Elevated PSA    Subjective         History of Present Illness  Gray Ball is a 62 y.o. male presents to [facility] to be seen for elevated PSA.    Patient reports he has had elevated testosterone for several years so they have been checking his PSA as well. He did have an elevation, so was sent here for evaluation    He reports he was exposed to jet fuel in drinking water in 1980.     Frequency- admits- but does drink a lot of coffee    Urgency- admits    Incontinence- denies    Perineal pain- denies    Nocturia- occasional    Stream- normal but more effort    Hesitancy- admits    GH- denies     surgeries- denies    Stones- years ago- passed    Family history of  malignancy- denies    Cardiopulmonary- denies    Anticoagulants- denies    Smoker- denies    PSA  4/19/2023 4.82  3/22/2023 5.88  9/12/2022 3.95  9/15/2021 3.53  9/14/2020 2.89  9/12/2019 2.81      Objective     Past Medical History:   Diagnosis Date   • ADHD (attention deficit hyperactivity disorder) ?    Late 90s   • Allergic rhinitis    • Anxiety    • Arthritis    • Broken bones     Both hands/thumbs    • Colon cancer screening    • Depression    • Deviated nasal septum    • Ear pain     Lt ear drum ruptured    • Elevated ALT measurement 01/11/2015   • Elevated PSA    • Erectile dysfunction ?    I'm 62 things work just not as well   • Forgetfulness    • History of migraine headaches    • History of psychiatric care 2005   • Hypertriglyceridemia 01/11/2015   • Hypertrophy of both inferior nasal turbinates    • Kidney calculus    • Nasal crusting    • Nasal obstruction    • Night sweats    • Prostate cancer screening     9/12/2019-PSA 2.81       Past Surgical History:   Procedure Laterality Date   • EYE SURGERY  1975    No depth perception. Lt eye looks off to the Lt side.    • FINGER SURGERY  2017    Trapeziectomy    • NASAL SEPTUM SURGERY  12/13/2019   • ROTATOR CUFF REPAIR  2005   • SEPTOPLASTY,  "RESECTION INFERIOR TURBINATES  2019   • SINUS SURGERY  ?    Bridge in nose repositioned scar tissue removed o         Current Outpatient Medications:   •  multivitamin with minerals tablet tablet, Take 1 tablet by mouth Daily., Disp: , Rfl:   •  Diclofenac Sodium (VOLTAREN) 1 % gel gel, Apply 4 g topically to the appropriate area as directed 4 (Four) Times a Day As Needed. (Patient not taking: Reported on 2023), Disp: , Rfl:     Allergies   Allergen Reactions   • Chlorothiazide Unknown - High Severity   • Penicillins Unknown - High Severity        Family History   Problem Relation Age of Onset   • Brain cancer Mother    • Cancer Mother         Brain cancer   • Mental illness Mother    • Stroke Paternal Grandmother    • Stroke Paternal Grandfather    • Cancer Father         Cancer everywhere   • Alcohol abuse Son         Generally addictive       Social History     Socioeconomic History   • Marital status:    Tobacco Use   • Smoking status: Former     Packs/day: 2.50     Years: 15.00     Pack years: 37.50     Types: Cigarettes     Quit date: 1986     Years since quittin.3     Passive exposure: Past   • Smokeless tobacco: Never   • Tobacco comments:     Smoked 10 years    Vaping Use   • Vaping Use: Never used   Substance and Sexual Activity   • Alcohol use: Not Currently   • Drug use: Never   • Sexual activity: Yes     Partners: Female       Vital Signs:   Resp 18   Ht 162.6 cm (64\")   Wt 66.7 kg (147 lb)   BMI 25.23 kg/m²      Physical Exam  Vitals reviewed.   Constitutional:       Appearance: Normal appearance.   Neurological:      General: No focal deficit present.      Mental Status: He is alert and oriented to person, place, and time.   Psychiatric:         Mood and Affect: Mood normal.         Behavior: Behavior normal.          Result Review :   The following data was reviewed by: PARUL Garrett on 2023:  Results for orders placed or performed in visit on " 05/09/23   Bladder Scan   Result Value Ref Range    Urine Volume 94       PSA        9/12/2022    09:33 3/22/2023    08:23 4/19/2023    08:11   PSA   PSA 3.950   5.880   4.820       Bladder Scan interpretation 05/09/2023    Estimation of residual urine via BVI 3000 Verathon Bladder Scan  MA/nurse performing: Radha JORGE MA  Residual Urine: 94 ml  Indication: Elevated prostate specific antigen (PSA)   Position: Supine  Examination: Incremental scanning of the suprapubic area using 2.0 MHz transducer using copious amounts of acoustic gel.   Findings: An anechoic area was demonstrated which represented the bladder, with measurement of residual urine as noted. I inspected this myself. In that the residual urine was  insignificant, refer to plan for treatment and plan necessary at this time.           Procedures        Assessment and Plan    Diagnoses and all orders for this visit:    1. Elevated prostate specific antigen (PSA) (Primary)  -     Bladder Scan    We discussed that there is no PSA level at which a patient can be told that he does not have prostate cancer. Patient notes understanding that whether or not further workup for prostate cancer is pursued,  a diagnosis of clinically significant prostate cancer would remains uncertain unless detected on biopsy. A negative biopsy, although reassuring, would not eliminate the possible need for further surveillance of PSA, possible future biopsy, and imaging as he may have undetected prostate cancer.     Given that his PSA remains elevated on the second test, we will go ahead and order an MRI of the prostate.  We did discuss that with his elevated testosterone, he should have a work-up done by endocrinology.  He said that he wanted to discuss this with his PCP so no referral was entered for this.  We also discussed that if his MRI of the prostate shows any concerning lesions we would need to get him set up to do a biopsy at the hospital.    We will follow-up with him  after the MRI is completed to come up with a plan of care.      Follow Up   No follow-ups on file.  Patient was given instructions and counseling regarding his condition or for health maintenance advice. Please see specific information pulled into the AVS if appropriate.         This document has been electronically signed by PARUL Garrett  May 9, 2023 10:13 EDT

## 2023-05-09 ENCOUNTER — OFFICE VISIT (OUTPATIENT)
Dept: UROLOGY | Facility: CLINIC | Age: 63
End: 2023-05-09
Payer: MEDICARE

## 2023-05-09 VITALS — HEIGHT: 64 IN | RESPIRATION RATE: 18 BRPM | WEIGHT: 147 LBS | BODY MASS INDEX: 25.1 KG/M2

## 2023-05-09 DIAGNOSIS — R97.20 ELEVATED PROSTATE SPECIFIC ANTIGEN (PSA): Primary | ICD-10-CM

## 2023-05-09 LAB — URINE VOLUME: 94

## 2023-05-23 ENCOUNTER — OFFICE VISIT (OUTPATIENT)
Dept: FAMILY MEDICINE CLINIC | Facility: CLINIC | Age: 63
End: 2023-05-23
Payer: MEDICARE

## 2023-05-23 VITALS
HEIGHT: 64 IN | BODY MASS INDEX: 24.75 KG/M2 | SYSTOLIC BLOOD PRESSURE: 118 MMHG | WEIGHT: 145 LBS | OXYGEN SATURATION: 99 % | DIASTOLIC BLOOD PRESSURE: 58 MMHG | TEMPERATURE: 97.9 F | HEART RATE: 66 BPM

## 2023-05-23 DIAGNOSIS — Z00.00 MEDICARE ANNUAL WELLNESS VISIT, SUBSEQUENT: Primary | ICD-10-CM

## 2023-05-23 DIAGNOSIS — R97.20 ELEVATED PSA: ICD-10-CM

## 2023-05-23 DIAGNOSIS — Z23 NEED FOR TDAP VACCINATION: ICD-10-CM

## 2023-05-23 DIAGNOSIS — J30.9 ALLERGIC RHINITIS, UNSPECIFIED SEASONALITY, UNSPECIFIED TRIGGER: ICD-10-CM

## 2023-05-23 DIAGNOSIS — R79.89 ELEVATED TESTOSTERONE LEVEL: ICD-10-CM

## 2023-05-23 DIAGNOSIS — M18.12 ARTHRITIS OF CARPOMETACARPAL (CMC) JOINT OF LEFT THUMB: ICD-10-CM

## 2023-05-23 NOTE — PROGRESS NOTES
"Chief Complaint  Medicare Wellness-subsequent    Subjective    {Problem List  Visit Diagnosis   Encounters  Notes  Medications  Labs  Result Review Imaging  Media :23}    Gray Ball presents to [facility]  History of Present Illness    Objective   Vital Signs:  /58 (BP Location: Right arm, Patient Position: Sitting)   Pulse 66   Temp 97.9 °F (36.6 °C) (Oral)   Ht 162.6 cm (64\")   Wt 65.8 kg (145 lb)   SpO2 99%   BMI 24.89 kg/m²   Estimated body mass index is 24.89 kg/m² as calculated from the following:    Height as of this encounter: 162.6 cm (64\").    Weight as of this encounter: 65.8 kg (145 lb).       BMI is within normal parameters. No other follow-up for BMI required.      Physical Exam   Result Review :{Labs  Result Review  Imaging  Med Tab  Media  Procedures  :23}  {The following data was reviewed by (Optional):29136}  {Ambulatory Labs (Optional):01384}  {Data reviewed (Optional):88200:::1}             Assessment and Plan {CC Problem List  Visit Diagnosis   ROS  Review (Popup)  Health Maintenance  Quality  BestPractice  Medications  SmartSets  SnapShot Encounters  Media :23}  There are no diagnoses linked to this encounter.       {Time Spent (Optional):96891}  Follow Up {Instructions Charge Capture  Follow-up Communications :23}  No follow-ups on file.  Patient was given instructions and counseling regarding his condition or for health maintenance advice. Please see specific information pulled into the AVS if appropriate.       "

## 2023-05-23 NOTE — PROGRESS NOTES
The ABCs of the Annual Wellness Visit  Subsequent Medicare Wellness Visit    Subjective    Gray Ball is a 62 y.o. male who presents for a Subsequent Medicare Wellness Visit.    The following portions of the patient's history were reviewed and   updated as appropriate: allergies, current medications, past family history, past medical history, past social history, past surgical history and problem list.    Compared to one year ago, the patient feels his physical   health is worse.    Compared to one year ago, the patient feels his mental   health is the same.    Recent Hospitalizations:  He was not admitted to the hospital during the last year.       Current Medical Providers:  Patient Care Team:  Mikey Evans DO as PCP - General (Family Medicine)  Jillian Cui APRN as Nurse Practitioner (Urology)    Outpatient Medications Prior to Visit   Medication Sig Dispense Refill   • multivitamin with minerals tablet tablet Take 1 tablet by mouth Daily.     • Diclofenac Sodium (VOLTAREN) 1 % gel gel Apply 4 g topically to the appropriate area as directed 4 (Four) Times a Day As Needed. (Patient not taking: Reported on 5/9/2023)       No facility-administered medications prior to visit.       No opioid medication identified on active medication list. I have reviewed chart for other potential  high risk medication/s and harmful drug interactions in the elderly.          Aspirin is not on active medication list.  Aspirin use is not indicated based on review of current medical condition/s. Risk of harm outweighs potential benefits.  .    Patient Active Problem List   Diagnosis   • Colon cancer screening     Advance Care Planning   Advance Care Planning     Advance Directive is not on file.  ACP discussion was held with the patient during this visit. Patient has an advance directive (not in EMR), copy requested.     Objective    Vitals:    05/23/23 1433   BP: 118/58   BP Location: Right arm   Patient Position:  "Sitting   Pulse: 66   Temp: 97.9 °F (36.6 °C)   TempSrc: Oral   SpO2: 99%   Weight: 65.8 kg (145 lb)   Height: 162.6 cm (64\")     Estimated body mass index is 24.89 kg/m² as calculated from the following:    Height as of this encounter: 162.6 cm (64\").    Weight as of this encounter: 65.8 kg (145 lb).    BMI is within normal parameters. No other follow-up for BMI required.      Does the patient have evidence of cognitive impairment? No          HEALTH RISK ASSESSMENT    Smoking Status:  Social History     Tobacco Use   Smoking Status Former   • Packs/day: 2.50   • Years: 15.00   • Pack years: 37.50   • Types: Cigarettes   • Quit date: 1986   • Years since quittin.4   • Passive exposure: Past   Smokeless Tobacco Never   Tobacco Comments    Smoked 10 years      Alcohol Consumption:  Social History     Substance and Sexual Activity   Alcohol Use Not Currently     Fall Risk Screen:    STEADI Fall Risk Assessment was completed, and patient is at MODERATE risk for falls. Assessment completed on:2023    Depression Screenin/23/2023     2:38 PM   PHQ-2/PHQ-9 Depression Screening   Little Interest or Pleasure in Doing Things 0-->not at all   Feeling Down, Depressed or Hopeless 1-->several days   PHQ-9: Brief Depression Severity Measure Score 1       Health Habits and Functional and Cognitive Screenin/23/2023     2:38 PM   Functional & Cognitive Status   Do you have difficulty concentrating, remembering or making decisions? Yes       Age-appropriate Screening Schedule:  Refer to the list below for future screening recommendations based on patient's age, sex and/or medical conditions. Orders for these recommended tests are listed in the plan section. The patient has been provided with a written plan.    Health Maintenance   Topic Date Due   • TDAP/TD VACCINES (1 - Tdap) Never done   • ZOSTER VACCINE (1 of 2) Never done   • HEPATITIS C SCREENING  Never done   • LIPID PANEL  09/15/2022   • " "INFLUENZA VACCINE  08/01/2023   • ANNUAL WELLNESS VISIT  05/23/2024   • COLORECTAL CANCER SCREENING  10/01/2024   • COVID-19 Vaccine  Completed   • Pneumococcal Vaccine 0-64  Aged Out                  CMS Preventative Services Quick Reference  Risk Factors Identified During Encounter  None Identified  The above risks/problems have been discussed with the patient.  Pertinent information has been shared with the patient in the After Visit Summary.  An After Visit Summary and PPPS were made available to the patient.    Follow Up:   Next Medicare Wellness visit to be scheduled in 1 year.       Additional E&M Note during same encounter follows:  Patient has multiple medical problems which are significant and separately identifiable that require additional work above and beyond the Medicare Wellness Visit.      Chief Complaint  Medicare Wellness-subsequent    Subjective        HPI    Patient presents today for Medicare annual wellness visit.  His history has been reviewed today.  He has an elevated PSA level which is being monitored by urology.  He does have an MRI of the prostate coming up.  He also has an elevated testosterone level which has been persistent over the 3 times that has been tested.  I discussed with him referral to endocrinology.  He received a corticosteroid injection for first CMC joint arthritis of the left hand on his last visit on 3/13/2023.  This is doing better now.  He reports improvement of pain.  He is due for Tdap vaccination today.  He is having more issues with a runny nose ever since he had COVID.  He does use saline nasal spray which has been helpful.           Objective   Vital Signs:  /58 (BP Location: Right arm, Patient Position: Sitting)   Pulse 66   Temp 97.9 °F (36.6 °C) (Oral)   Ht 162.6 cm (64\")   Wt 65.8 kg (145 lb)   SpO2 99%   BMI 24.89 kg/m²     Physical Exam   General: AAO ×3, no acute distress, pleasant  HEENT: Normocephalic, atraumatic, nasal congestion noted " bilaterally  Cardiovascular: Regular rate and rhythm without appreciable murmur  Respiratory: Clear to auscultation bilaterally no RRW  Gastrointestinal: Soft nontender nondistended with bowel sounds present  extremities: No edema  Neurologic: CN II through XII grossly intact   Psychiatric: Normal mood and affect                 Assessment and Plan   Diagnoses and all orders for this visit:    1. Medicare annual wellness visit, subsequent (Primary)    2. Elevated PSA    3. Elevated testosterone level  -     Ambulatory Referral to Endocrinology    4. Need for Tdap vaccination    5. Allergic rhinitis, unspecified seasonality, unspecified trigger    6. Arthritis of carpometacarpal (CMC) joint of left thumb      I discussed with patient sending in ipratropium nasal spray.  He reports that his symptoms are manageable and would like to hold off at this time and will continue using saline nasal spray.  Tdap vaccination to be given today.  We did discuss referral to endocrinology for elevated testosterone level and he is encouraged to keep his follow-up for his elevated PSA with urology.       Follow Up   Return in about 3 months (around 8/23/2023) for elevated testosterone.  Patient was given instructions and counseling regarding his condition or for health maintenance advice. Please see specific information pulled into the AVS if appropriate.

## 2023-06-12 ENCOUNTER — OFFICE VISIT (OUTPATIENT)
Dept: ENDOCRINOLOGY | Age: 63
End: 2023-06-12
Payer: MEDICARE

## 2023-06-12 VITALS
OXYGEN SATURATION: 99 % | DIASTOLIC BLOOD PRESSURE: 72 MMHG | HEIGHT: 64 IN | BODY MASS INDEX: 24.89 KG/M2 | SYSTOLIC BLOOD PRESSURE: 128 MMHG | HEART RATE: 66 BPM | TEMPERATURE: 96.9 F | WEIGHT: 145.8 LBS

## 2023-06-12 DIAGNOSIS — R79.89 ELEVATED TESTOSTERONE LEVEL: Primary | ICD-10-CM

## 2023-06-12 NOTE — PROGRESS NOTES
Referring provider: Mikey Evans DO     Reason for consult:  Elevated testosterone    HPI:   - 62 year old male here for elevated testosterone  - He states he had routine lab work checked showing an elevated testosterone and elevated PSA  - He is seeing a urologist about his elevated PSA  - He denies ever taking testosterone in any form    The following portions of the patient's history were reviewed and updated as appropriate: allergies, current medications, past family history, past medical history, past social history, past surgical history, and problem list.    Objective     Vitals:    06/12/23 0939   BP: 128/72   Pulse: 66   Temp: 96.9 °F (36.1 °C)   SpO2: 99%        Physical Exam  Constitutional:       Appearance: Normal appearance.   HENT:      Head: Normocephalic and atraumatic.   Eyes:      General: No scleral icterus.  Pulmonary:      Effort: No respiratory distress.   Neurological:      Mental Status: He is alert.      Gait: Gait normal.   Psychiatric:         Mood and Affect: Mood normal.         Behavior: Behavior normal.         Thought Content: Thought content normal.         Judgment: Judgment normal.     Labs/Imaging:  Reviewed lab work showing 2 elevated total testosterone levels including one of 905 two months ago    Assessment & Plan   Elevated testosterone  - The most likely reason for this would be an increase in SHBG causing an elevated total and normal free testosterone  - Will order free and total testosterone, LH and FSH    - Return  appt. Will be decided after reviewing lab work

## 2023-06-15 LAB
FSH SERPL-ACNC: 12.8 MIU/ML (ref 1.5–12.4)
LH SERPL-ACNC: 10.4 MIU/ML (ref 1.7–8.6)
TESTOST FREE SERPL-MCNC: 6.6 PG/ML (ref 6.6–18.1)
TESTOST SERPL-MCNC: 771.7 NG/DL (ref 264–916)

## 2023-06-16 ENCOUNTER — PATIENT ROUNDING (BHMG ONLY) (OUTPATIENT)
Dept: ENDOCRINOLOGY | Age: 63
End: 2023-06-16

## 2023-08-08 ENCOUNTER — TELEPHONE (OUTPATIENT)
Dept: FAMILY MEDICINE CLINIC | Facility: CLINIC | Age: 63
End: 2023-08-08

## 2023-08-08 NOTE — TELEPHONE ENCOUNTER
Caller: Gray Ball    Relationship: Self    Best call back number: [phone]     Who are you requesting to speak with (clinical staff, provider,  specific staff member): MEDICAL STAFF    What was the call regarding: PATIENT HAS AN APPOINTMENT NEXT WEEK AND WOULD LIKE TO KNOW IF HE NEEDS TO COMPLETE LABS BEFORE THE APPOINTMENT. PLEASE CALL PATIENT TO ADVISE.

## 2023-08-15 ENCOUNTER — OFFICE VISIT (OUTPATIENT)
Dept: FAMILY MEDICINE CLINIC | Facility: CLINIC | Age: 63
End: 2023-08-15
Payer: MEDICARE

## 2023-08-15 VITALS
TEMPERATURE: 98.2 F | BODY MASS INDEX: 24.11 KG/M2 | OXYGEN SATURATION: 100 % | WEIGHT: 144.7 LBS | HEIGHT: 65 IN | DIASTOLIC BLOOD PRESSURE: 72 MMHG | SYSTOLIC BLOOD PRESSURE: 120 MMHG | HEART RATE: 87 BPM

## 2023-08-15 DIAGNOSIS — L98.9 SKIN LESION: ICD-10-CM

## 2023-08-15 DIAGNOSIS — Z11.59 NEED FOR HEPATITIS C SCREENING TEST: ICD-10-CM

## 2023-08-15 DIAGNOSIS — Z51.81 MEDICATION MONITORING ENCOUNTER: ICD-10-CM

## 2023-08-15 DIAGNOSIS — R79.89 ELEVATED TESTOSTERONE LEVEL: ICD-10-CM

## 2023-08-15 DIAGNOSIS — M79.18 LEFT BUTTOCK PAIN: ICD-10-CM

## 2023-08-15 DIAGNOSIS — M18.12 ARTHRITIS OF CARPOMETACARPAL (CMC) JOINT OF LEFT THUMB: Primary | ICD-10-CM

## 2023-08-15 DIAGNOSIS — R97.20 ELEVATED PSA: ICD-10-CM

## 2023-08-15 DIAGNOSIS — R79.89 LOW SERUM LOW DENSITY LIPOPROTEIN (LDL): ICD-10-CM

## 2023-08-15 RX ORDER — TRIAMCINOLONE ACETONIDE 40 MG/ML
10 INJECTION, SUSPENSION INTRA-ARTICULAR; INTRAMUSCULAR
Status: COMPLETED | OUTPATIENT
Start: 2023-08-15 | End: 2023-08-15

## 2023-08-15 RX ORDER — LIDOCAINE HYDROCHLORIDE 10 MG/ML
0.25 INJECTION, SOLUTION INFILTRATION; PERINEURAL
Status: COMPLETED | OUTPATIENT
Start: 2023-08-15 | End: 2023-08-15

## 2023-08-15 RX ADMIN — LIDOCAINE HYDROCHLORIDE 0.25 ML: 10 INJECTION, SOLUTION INFILTRATION; PERINEURAL at 16:40

## 2023-08-15 RX ADMIN — TRIAMCINOLONE ACETONIDE 10 MG: 40 INJECTION, SUSPENSION INTRA-ARTICULAR; INTRAMUSCULAR at 16:40

## 2023-08-15 NOTE — PROGRESS NOTES
"Chief Complaint  Cmc joint arthritis  Elevated PSA  Elevated testosterone    Subjective        Gray Ball presents to [facility]  History of Present Illness  Patient presents today to follow-up for arthritis involving the first CMC joint of the left hand.  I previously gave him an injection on 3/13/2023 which was beneficial up until a couple weeks ago when symptoms started to return.  He does have previously noted and documented CMC arthritis.  We did discuss options today.  We discussed repeating the corticosteroid injection.  He did see Dr. Kan Iyer in regards to an elevated testosterone level.  While he did have an elevated FSH and LH his testosterone level was normal per consultation.  No additional testing was recommended at that time.    Patient does have an elevated PSA and seen by urology.  He does have a follow-up in September.  MRI of the prostate previously noted sequela of prostatitis with benign prostatic hyperplasia with no suspicious lesion.  He has a skin lesion on the left temple region that has been present for an unknown duration.  He thinks it may be a scab.  This measures 7 x 3 mm.  We did discuss having labs repeated when he gets his PSA checked next month.  Previously noted to have a low LDL.  He has had some left buttock pain for the past few days.  He gets this periodically but this has persisted.  Objective   Vital Signs:  /72 (BP Location: Left arm, Patient Position: Sitting)   Pulse 87   Temp 98.2 øF (36.8 øC) (Oral)   Ht 165.1 cm (65\")   Wt 65.6 kg (144 lb 11.2 oz)   SpO2 100%   BMI 24.08 kg/mý   Estimated body mass index is 24.08 kg/mý as calculated from the following:    Height as of this encounter: 165.1 cm (65\").    Weight as of this encounter: 65.6 kg (144 lb 11.2 oz).       BMI is within normal parameters. No other follow-up for BMI required.      Physical Exam   General: AAO x3, no acute distress, pleasant  HEENT: " Normocephalic, atraumatic  Skin: See photo below.  Macular 7 x 3 mm lesion with slight erythema noted on the left temple region over the temporal artery.  Nontender to palpation.  That is sharply demarcated.  Cardiovascular: Regular rate and rhythm without appreciable murmur  Respiratory: Clear to auscultation bilaterally no RRW  Gastrointestinal: Soft nontender nondistended with bowel sounds present  extremities: No edema  Musculoskeletal: Localized tenderness to palpation in the left buttock region.  Neurologic: CN II through XII grossly intact   Psychiatric: Normal mood and affect      Result Review :            Arthrocentesis    Date/Time: 8/15/2023 4:40 PM  Performed by: Mikey Evans DO  Authorized by: Mikey Evans DO   Indications: pain   Preparation: Patient was prepped and draped in the usual sterile fashion.  Needle gauge: 25.  Meds administered: 0.25 mL lidocaine 1 %; 10 mg triamcinolone acetonide 40 MG/ML  Patient tolerance: patient tolerated the procedure well with no immediate complications  Comments: Patient provided verbal and written consent for treatment.  Corticosteroid injection provided for first CMC joint of left hand.          Assessment and Plan   Diagnoses and all orders for this visit:    1. Arthritis of carpometacarpal (CMC) joint of left thumb (Primary)  -     Arthrocentesis    2. Elevated PSA    3. Elevated testosterone level    4. Skin lesion    5. Low serum low density lipoprotein (LDL)  -     Lipid Panel; Future    6. Need for hepatitis C screening test  -     Hepatitis C Antibody; Future    7. Medication monitoring encounter  -     CBC & Differential; Future  -     Comprehensive Metabolic Panel; Future    8. Left buttock pain    Postprocedural instructions were given to the patient.  I discussed with him monitoring the skin lesion at this time.  She did not have any resolution as he feels that this may be a scab I discussed with him to return for further evaluation.  We  would consider at that time referral to dermatology.  I did discuss with patient should he have any further issues with CMC joint arthritis to call or return.  I will see him back in 6 months with labs drawn next month.  As far as his buttock pain is concerned I suspect this is related to tight hamstring muscles.  We discussed stretching regularly.         Follow Up   Return in about 6 months (around 2/15/2024) for cmc arthritis.  Patient was given instructions and counseling regarding his condition or for health maintenance advice. Please see specific information pulled into the AVS if appropriate.

## 2023-09-27 ENCOUNTER — TELEPHONE (OUTPATIENT)
Dept: UROLOGY | Facility: CLINIC | Age: 63
End: 2023-09-27
Payer: MEDICARE

## 2023-09-27 NOTE — TELEPHONE ENCOUNTER
Sent [employer] message to remind patient to go and have his PSA redrawn for his appt on Friday

## 2023-09-28 NOTE — PROGRESS NOTES
Chief Complaint: Elevated PSA    Subjective         History of Present Illness  Gray Ball is a 62 y.o. male presents to [facility] to be seen for follow-up.    Patient was previously seen by me with last visit on 6/29/2023 for elevated PSA.  We did treat him with him a month-long course of antibiotics and he is here for follow-up.    He has no urinary symptoms.     He has not had his repeat PSA completed, he states he will complete that next week.    Previous 6/29/2023:  Patient was previously seen by me with an elevated PSA with last visit on 5/9/2023. He did have an MRI of the prostate he is here for follow-up.     MRI of the prostate  6/15/2023 MRI reveals benign prostatic hyperplasia without suspicious lesions per PI-RADS 2 area.  Diffuse ill-defined areas of T2 hypointensity throughout the peripheral zone suggesting sequela of prostatitis.        Previous 5/9/2023:  Patient reports he has had elevated testosterone for several years so they have been checking his PSA as well. He did have an elevation, so was sent here for evaluation     He reports he was exposed to jet fuel in drinking water in 1980.    Frequency- admits- but does drink a lot of coffee   Urgency- admits   Incontinence- denies   Perineal pain- denies   Nocturia- occasional   Stream- normal but more effort   Hesitancy- admits   GH- denies    surgeries- denies   Stones- years ago- passed   Family history of  malignancy- Prostate GF at age 85   Cardiopulmonary- denies   Anticoagulants- denies   Smoker- denies     PSA  4/19/2023 4.82  3/22/2023 5.88  9/12/2022 3.95  9/15/2021 3.53  9/14/2020 2.89  9/12/2019 2.81          Objective     Past Medical History:   Diagnosis Date    ADHD (attention deficit hyperactivity disorder) ?    Late 90s    Allergic rhinitis     Anxiety     Arthritis     Broken bones     Both hands/thumbs     Colon cancer screening     Depression     Deviated nasal septum     Ear pain     Lt ear drum  ruptured     Elevated ALT measurement 2015    Elevated PSA     Erectile dysfunction ?    I'm 62 things work just not as well    Forgetfulness     History of migraine headaches     History of psychiatric care 2005    Hypertriglyceridemia 2015    Hypertrophy of both inferior nasal turbinates     Kidney calculus     Nasal crusting     Nasal obstruction     Night sweats     Prostate cancer screening     2019-PSA 2.81       Past Surgical History:   Procedure Laterality Date    EYE SURGERY  1975    No depth perception. Lt eye looks off to the Lt side.     FINGER SURGERY  2017    Trapeziectomy     NASAL SEPTUM SURGERY  2019    ROTATOR CUFF REPAIR  2005    SEPTOPLASTY, RESECTION INFERIOR TURBINATES  2019    SINUS SURGERY  ?    Bridge in nose repositioned scar tissue removed o         Current Outpatient Medications:     Diclofenac Sodium (VOLTAREN) 1 % gel gel, Apply 4 g topically to the appropriate area as directed 4 (Four) Times a Day As Needed., Disp: , Rfl:     Lactobacillus (Florajen Acidophilus) capsule, Take 1 capsule by mouth Daily., Disp: 28 capsule, Rfl: 0    multivitamin with minerals tablet tablet, Take 1 tablet by mouth Daily., Disp: , Rfl:     Allergies   Allergen Reactions    Chlorothiazide Unknown - High Severity    Penicillins Unknown - High Severity        Family History   Problem Relation Age of Onset    Brain cancer Mother     Cancer Mother         Brain cancer    Mental illness Mother     Stroke Paternal Grandmother     Stroke Paternal Grandfather     Cancer Father         Cancer everywhere    Alcohol abuse Son         Generally addictive       Social History     Socioeconomic History    Marital status:    Tobacco Use    Smoking status: Former     Packs/day: 2.50     Years: 15.00     Pack years: 37.50     Types: Cigarettes     Quit date: 1986     Years since quittin.7     Passive exposure: Past    Smokeless tobacco: Never    Tobacco comments:     Smoked 10  "years    Vaping Use    Vaping Use: Never used   Substance and Sexual Activity    Alcohol use: Not Currently    Drug use: Never    Sexual activity: Yes     Partners: Female       Vital Signs:   /62 (BP Location: Left arm, Patient Position: Sitting, Cuff Size: Adult)   Pulse 68   Resp 14   Ht 165.1 cm (65\")   Wt 62.6 kg (138 lb)   BMI 22.96 kg/m²      Physical Exam  Vitals reviewed.   Constitutional:       Appearance: Normal appearance.   Neurological:      General: No focal deficit present.      Mental Status: He is alert and oriented to person, place, and time.   Psychiatric:         Mood and Affect: Mood normal.         Behavior: Behavior normal.        Result Review :   The following data was reviewed by: PARUL Garrett on 09/29/2023:     PSA          3/22/2023    08:23 4/19/2023    08:11   PSA   PSA 5.880  4.820      Bladder Scan interpretation 09/29/2023    Estimation of residual urine via BVI 3000 Verathon Bladder Scan  MA/nurse performing: Radha JORGE MA  Residual Urine: 27 ml  Indication: Elevated prostate specific antigen (PSA)   Position: Supine  Examination: Incremental scanning of the suprapubic area using 2.0 MHz transducer using copious amounts of acoustic gel.   Findings: An anechoic area was demonstrated which represented the bladder, with measurement of residual urine as noted. I inspected this myself. In that the residual urine was stable or insignificant, refer to plan for treatment and plan necessary at this time.           Procedures        Assessment and Plan    Diagnoses and all orders for this visit:    1. Elevated prostate specific antigen (PSA) (Primary)  -     Bladder Scan    We will follow-up with patient when PSA results are available to come up with a plan of when to repeat levels and follow-up.        Follow Up   No follow-ups on file.  Patient was given instructions and counseling regarding his condition or for health maintenance advice. Please see specific " information pulled into the AVS if appropriate.         This document has been electronically signed by PARUL Garrett  September 29, 2023 13:40 EDT

## 2023-09-29 ENCOUNTER — OFFICE VISIT (OUTPATIENT)
Dept: UROLOGY | Facility: CLINIC | Age: 63
End: 2023-09-29
Payer: MEDICARE

## 2023-09-29 VITALS
SYSTOLIC BLOOD PRESSURE: 112 MMHG | WEIGHT: 138 LBS | HEIGHT: 65 IN | HEART RATE: 68 BPM | BODY MASS INDEX: 22.99 KG/M2 | RESPIRATION RATE: 14 BRPM | DIASTOLIC BLOOD PRESSURE: 62 MMHG

## 2023-09-29 DIAGNOSIS — R97.20 ELEVATED PROSTATE SPECIFIC ANTIGEN (PSA): Primary | ICD-10-CM

## 2023-09-29 LAB — URINE VOLUME: 27

## 2023-09-29 PROCEDURE — 99212 OFFICE O/P EST SF 10 MIN: CPT | Performed by: NURSE PRACTITIONER

## 2023-09-29 PROCEDURE — 1159F MED LIST DOCD IN RCRD: CPT | Performed by: NURSE PRACTITIONER

## 2023-09-29 PROCEDURE — 1160F RVW MEDS BY RX/DR IN RCRD: CPT | Performed by: NURSE PRACTITIONER

## 2023-10-02 ENCOUNTER — CLINICAL SUPPORT (OUTPATIENT)
Dept: FAMILY MEDICINE CLINIC | Facility: CLINIC | Age: 63
End: 2023-10-02
Payer: MEDICARE

## 2023-10-02 DIAGNOSIS — Z51.81 MEDICATION MONITORING ENCOUNTER: ICD-10-CM

## 2023-10-02 DIAGNOSIS — R79.89 LOW SERUM LOW DENSITY LIPOPROTEIN (LDL): ICD-10-CM

## 2023-10-02 DIAGNOSIS — Z11.59 NEED FOR HEPATITIS C SCREENING TEST: ICD-10-CM

## 2023-10-02 DIAGNOSIS — R97.20 ELEVATED PROSTATE SPECIFIC ANTIGEN (PSA): ICD-10-CM

## 2023-10-02 LAB
ALBUMIN SERPL-MCNC: 4.7 G/DL (ref 3.5–5.2)
ALBUMIN/GLOB SERPL: 2 G/DL
ALP SERPL-CCNC: 79 U/L (ref 39–117)
ALT SERPL W P-5'-P-CCNC: 43 U/L (ref 1–41)
ANION GAP SERPL CALCULATED.3IONS-SCNC: 11.8 MMOL/L (ref 5–15)
AST SERPL-CCNC: 19 U/L (ref 1–40)
BASOPHILS # BLD AUTO: 0.03 10*3/MM3 (ref 0–0.2)
BASOPHILS NFR BLD AUTO: 0.5 % (ref 0–1.5)
BILIRUB SERPL-MCNC: 0.5 MG/DL (ref 0–1.2)
BUN SERPL-MCNC: 12 MG/DL (ref 8–23)
BUN/CREAT SERPL: 13.3 (ref 7–25)
CALCIUM SPEC-SCNC: 9.7 MG/DL (ref 8.6–10.5)
CHLORIDE SERPL-SCNC: 105 MMOL/L (ref 98–107)
CHOLEST SERPL-MCNC: 152 MG/DL (ref 0–200)
CO2 SERPL-SCNC: 24.2 MMOL/L (ref 22–29)
CREAT SERPL-MCNC: 0.9 MG/DL (ref 0.76–1.27)
DEPRECATED RDW RBC AUTO: 42.8 FL (ref 37–54)
EGFRCR SERPLBLD CKD-EPI 2021: 95.4 ML/MIN/1.73
EOSINOPHIL # BLD AUTO: 0.06 10*3/MM3 (ref 0–0.4)
EOSINOPHIL NFR BLD AUTO: 1 % (ref 0.3–6.2)
ERYTHROCYTE [DISTWIDTH] IN BLOOD BY AUTOMATED COUNT: 13 % (ref 12.3–15.4)
GLOBULIN UR ELPH-MCNC: 2.3 GM/DL
GLUCOSE SERPL-MCNC: 89 MG/DL (ref 65–99)
HCT VFR BLD AUTO: 41.5 % (ref 37.5–51)
HCV AB SER DONR QL: NORMAL
HDLC SERPL-MCNC: 54 MG/DL (ref 40–60)
HGB BLD-MCNC: 14.1 G/DL (ref 13–17.7)
IMM GRANULOCYTES # BLD AUTO: 0.01 10*3/MM3 (ref 0–0.05)
IMM GRANULOCYTES NFR BLD AUTO: 0.2 % (ref 0–0.5)
LDLC SERPL CALC-MCNC: 85 MG/DL (ref 0–100)
LDLC/HDLC SERPL: 1.57 {RATIO}
LYMPHOCYTES # BLD AUTO: 1.71 10*3/MM3 (ref 0.7–3.1)
LYMPHOCYTES NFR BLD AUTO: 29.7 % (ref 19.6–45.3)
MCH RBC QN AUTO: 30.6 PG (ref 26.6–33)
MCHC RBC AUTO-ENTMCNC: 34 G/DL (ref 31.5–35.7)
MCV RBC AUTO: 90 FL (ref 79–97)
MONOCYTES # BLD AUTO: 0.49 10*3/MM3 (ref 0.1–0.9)
MONOCYTES NFR BLD AUTO: 8.5 % (ref 5–12)
NEUTROPHILS NFR BLD AUTO: 3.46 10*3/MM3 (ref 1.7–7)
NEUTROPHILS NFR BLD AUTO: 60.1 % (ref 42.7–76)
NRBC BLD AUTO-RTO: 0 /100 WBC (ref 0–0.2)
PLATELET # BLD AUTO: 267 10*3/MM3 (ref 140–450)
PMV BLD AUTO: 10 FL (ref 6–12)
POTASSIUM SERPL-SCNC: 4.3 MMOL/L (ref 3.5–5.2)
PROT SERPL-MCNC: 7 G/DL (ref 6–8.5)
PSA SERPL-MCNC: 6.98 NG/ML (ref 0–4)
RBC # BLD AUTO: 4.61 10*6/MM3 (ref 4.14–5.8)
SODIUM SERPL-SCNC: 141 MMOL/L (ref 136–145)
TRIGL SERPL-MCNC: 65 MG/DL (ref 0–150)
VLDLC SERPL-MCNC: 13 MG/DL (ref 5–40)
WBC NRBC COR # BLD: 5.76 10*3/MM3 (ref 3.4–10.8)

## 2023-10-02 PROCEDURE — 84153 ASSAY OF PSA TOTAL: CPT | Performed by: NURSE PRACTITIONER

## 2023-10-02 PROCEDURE — 85025 COMPLETE CBC W/AUTO DIFF WBC: CPT | Performed by: FAMILY MEDICINE

## 2023-10-02 PROCEDURE — 80061 LIPID PANEL: CPT | Performed by: NURSE PRACTITIONER

## 2023-10-02 PROCEDURE — 80053 COMPREHEN METABOLIC PANEL: CPT | Performed by: NURSE PRACTITIONER

## 2023-10-02 PROCEDURE — 86803 HEPATITIS C AB TEST: CPT | Performed by: FAMILY MEDICINE

## 2023-10-03 ENCOUNTER — TELEPHONE (OUTPATIENT)
Dept: UROLOGY | Facility: CLINIC | Age: 63
End: 2023-10-03
Payer: MEDICARE

## 2023-10-03 DIAGNOSIS — R97.20 ELEVATED PROSTATE SPECIFIC ANTIGEN (PSA): Primary | ICD-10-CM

## 2023-10-03 NOTE — TELEPHONE ENCOUNTER
----- Message from PARUL Garrett sent at 10/3/2023  7:53 AM EDT -----  Please advise patient that his PSA is higher than it was previously. I want to repeat the PSA in 6 months. I have placed order and he will need a f/u appt to go over results.

## 2023-10-03 NOTE — PROGRESS NOTES
Please advise patient that his PSA is higher than it was previously. I want to repeat the PSA in 6 months. I have placed order and he will need a f/u appt to go over results.

## 2024-02-12 ENCOUNTER — TELEPHONE (OUTPATIENT)
Dept: FAMILY MEDICINE CLINIC | Facility: CLINIC | Age: 64
End: 2024-02-12
Payer: MEDICARE

## 2024-02-20 ENCOUNTER — OFFICE VISIT (OUTPATIENT)
Dept: FAMILY MEDICINE CLINIC | Facility: CLINIC | Age: 64
End: 2024-02-20
Payer: MEDICARE

## 2024-02-20 VITALS
HEIGHT: 65 IN | BODY MASS INDEX: 23.91 KG/M2 | OXYGEN SATURATION: 100 % | HEART RATE: 73 BPM | WEIGHT: 143.5 LBS | SYSTOLIC BLOOD PRESSURE: 114 MMHG | TEMPERATURE: 98.3 F | DIASTOLIC BLOOD PRESSURE: 78 MMHG

## 2024-02-20 DIAGNOSIS — Z51.81 MEDICATION MONITORING ENCOUNTER: ICD-10-CM

## 2024-02-20 DIAGNOSIS — Z13.6 SCREENING FOR ABDOMINAL AORTIC ANEURYSM: ICD-10-CM

## 2024-02-20 DIAGNOSIS — H53.50 COLOR BLINDNESS: ICD-10-CM

## 2024-02-20 DIAGNOSIS — R79.89 ELEVATED LFTS: ICD-10-CM

## 2024-02-20 DIAGNOSIS — G56.92 NEUROPATHY OF LEFT HAND: ICD-10-CM

## 2024-02-20 DIAGNOSIS — L82.1 SEBORRHEIC KERATOSIS: ICD-10-CM

## 2024-02-20 DIAGNOSIS — R41.840 INATTENTION: ICD-10-CM

## 2024-02-20 DIAGNOSIS — R73.09 ABNORMAL GLUCOSE: ICD-10-CM

## 2024-02-20 DIAGNOSIS — R79.89 LOW SERUM LOW DENSITY LIPOPROTEIN (LDL): ICD-10-CM

## 2024-02-20 DIAGNOSIS — G56.91 NEUROPATHY OF RIGHT HAND: ICD-10-CM

## 2024-02-20 DIAGNOSIS — L91.8 ACQUIRED SKIN TAG: Primary | ICD-10-CM

## 2024-02-20 DIAGNOSIS — R97.20 ELEVATED PSA: ICD-10-CM

## 2024-02-20 DIAGNOSIS — M18.12 ARTHRITIS OF CARPOMETACARPAL (CMC) JOINT OF LEFT THUMB: ICD-10-CM

## 2024-02-20 NOTE — PROGRESS NOTES
Chief Complaint  Vision issue   Skin lesion    Subjective        Gray Ball presents to [facility]  History of Present Illness  Patient reports that lately over the past couple months he has noted that he has a hard time distinguishing certain shades of brown and green as well as distinguishing may be blue versus black.  He reports having recently seen his eye doctor and having had a new prescription given to him.    Patient also has a skin lesion on the left side of his neck/collarbone region that he would like to have looked at.  He also had a skin lesion previously on the left side of his head/temple region that was irritated.  He may have scratched off an area.  I did discuss reassessing this as well today.  He periodically gets numbness and a warm sensation in his hands at nighttime that wakes him up.  I did discuss with him options including evaluation for carpal tunnel today but he would like to hold off.  I previously gave him an injection for CMC arthritis of the left hand.  He previously had an injection on 3/13/2023 which was beneficial as well as an injection given on 8/15/2023.  He overall is doing well at this time and is not requesting a repeat injection.  He is due for labs and he is encouraged to get these done at his convenience fasting.  His last CMP did show slightly elevated ALT of 43.  He is also being followed by urology for an elevated PSA.  His last PSA was 6.980.  He previously had an MRI done of the prostate back in June 2023 that showed BPH with no suspicious lesion otherwise but there was sequela of prostatitis.  We did discuss checking a lipid panel.  He was previously noted to have a low LDL.  He is also previously had a low HDL.  He is having some issues with inattention.  He does admit that he is having a hard time reading a complex text.  He reports that he used to be able to do this better.  I did discuss with him further evaluation today but  "he would like to hold off as well.      Objective   Vital Signs:  /78   Pulse 73   Temp 98.3 °F (36.8 °C)   Ht 165.1 cm (65\")   Wt 65.1 kg (143 lb 8 oz)   SpO2 100%   BMI 23.88 kg/m²   Estimated body mass index is 23.88 kg/m² as calculated from the following:    Height as of this encounter: 165.1 cm (65\").    Weight as of this encounter: 65.1 kg (143 lb 8 oz).       BMI is within normal parameters. No other follow-up for BMI required.      Physical Exam  Vitals reviewed.   Constitutional:       Appearance: Normal appearance.   HENT:      Head: Normocephalic and atraumatic.      Right Ear: External ear normal.      Left Ear: External ear normal.      Mouth/Throat:      Pharynx: No oropharyngeal exudate.   Eyes:      Conjunctiva/sclera: Conjunctivae normal.   Cardiovascular:      Rate and Rhythm: Normal rate and regular rhythm.      Heart sounds: No murmur heard.     No friction rub. No gallop.   Pulmonary:      Effort: Pulmonary effort is normal.      Breath sounds: Normal breath sounds. No wheezing or rhonchi.   Abdominal:      General: Bowel sounds are normal. There is no distension.      Palpations: Abdomen is soft.      Tenderness: There is no abdominal tenderness.   Skin:     Comments: On the left side of his head/temple region there is a slightly raised hyperkeratotic scaly sharply demarcated lesion that appears consistent with a seborrheic keratosis.  This is sitting right over the left temporal artery.  This lesion measures 4 x 2 mm.  He also has a skin lesion that is a skin tag that is over the left clavicle.  This measures 6 x 4 mm.  It is sessile.   Neurological:      Mental Status: He is alert and oriented to person, place, and time.      Cranial Nerves: No cranial nerve deficit.   Psychiatric:         Mood and Affect: Mood and affect normal.         Behavior: Behavior normal.         Thought Content: Thought content normal.         Judgment: Judgment normal.        Result Review " :                     Assessment and Plan     Diagnoses and all orders for this visit:    1. Acquired skin tag (Primary)    2. Color blindness    3. Screening for abdominal aortic aneurysm    4. Arthritis of carpometacarpal (CMC) joint of left thumb    5. Neuropathy of left hand    6. Neuropathy of right hand    7. Seborrheic keratosis    8. Inattention    9. Elevated LFTs  -     CBC & Differential; Future  -     Comprehensive Metabolic Panel; Future    10. Abnormal glucose  -     Hemoglobin A1c; Future    11. Medication monitoring encounter  -     CBC & Differential; Future  -     Comprehensive Metabolic Panel; Future  -     Lipid Panel; Future    12. Low serum low density lipoprotein (LDL)  -     Lipid Panel; Future    13. Elevated PSA    I discussed with patient the skin tag on his left clavicle.  We did discuss options today including cryotherapy but he would like to hold off at this time.  In addition we discussed the lesion on the left temple region.  It is not causing any issues form at this time.  I suspect this is a benign lesion as it has the appearance of a seborrheic keratosis.  I did discuss with patient in regards to the difficulties he is having distinguishing colors to get back to see ophthalmology but he would like to hold off at this time.  I plan to see him back in 6 months for regular checkup.  He is encouraged to get labs done at his convenience and he is also encouraged to keep his appointment with urology.         Follow Up     Return in about 6 months (around 8/20/2024) for eleveted lft.  Patient was given instructions and counseling regarding his condition or for health maintenance advice. Please see specific information pulled into the AVS if appropriate.

## 2024-03-25 ENCOUNTER — CLINICAL SUPPORT (OUTPATIENT)
Dept: FAMILY MEDICINE CLINIC | Facility: CLINIC | Age: 64
End: 2024-03-25
Payer: MEDICARE

## 2024-03-25 DIAGNOSIS — R79.89 ELEVATED LFTS: ICD-10-CM

## 2024-03-25 DIAGNOSIS — Z51.81 MEDICATION MONITORING ENCOUNTER: ICD-10-CM

## 2024-03-25 DIAGNOSIS — R73.09 ABNORMAL GLUCOSE: ICD-10-CM

## 2024-03-25 DIAGNOSIS — R79.89 LOW SERUM LOW DENSITY LIPOPROTEIN (LDL): ICD-10-CM

## 2024-03-25 DIAGNOSIS — R97.20 ELEVATED PROSTATE SPECIFIC ANTIGEN (PSA): ICD-10-CM

## 2024-03-25 LAB
ALBUMIN SERPL-MCNC: 4.7 G/DL (ref 3.5–5.2)
ALBUMIN/GLOB SERPL: 2 G/DL
ALP SERPL-CCNC: 83 U/L (ref 39–117)
ALT SERPL W P-5'-P-CCNC: 31 U/L (ref 1–41)
ANION GAP SERPL CALCULATED.3IONS-SCNC: 10 MMOL/L (ref 5–15)
AST SERPL-CCNC: 15 U/L (ref 1–40)
BASOPHILS # BLD AUTO: 0.02 10*3/MM3 (ref 0–0.2)
BASOPHILS NFR BLD AUTO: 0.4 % (ref 0–1.5)
BILIRUB SERPL-MCNC: 0.4 MG/DL (ref 0–1.2)
BUN SERPL-MCNC: 10 MG/DL (ref 8–23)
BUN/CREAT SERPL: 9.6 (ref 7–25)
CALCIUM SPEC-SCNC: 9.7 MG/DL (ref 8.6–10.5)
CHLORIDE SERPL-SCNC: 107 MMOL/L (ref 98–107)
CHOLEST SERPL-MCNC: 144 MG/DL (ref 0–200)
CO2 SERPL-SCNC: 24 MMOL/L (ref 22–29)
CREAT SERPL-MCNC: 1.04 MG/DL (ref 0.76–1.27)
DEPRECATED RDW RBC AUTO: 42.6 FL (ref 37–54)
EGFRCR SERPLBLD CKD-EPI 2021: 79.7 ML/MIN/1.73
EOSINOPHIL # BLD AUTO: 0.07 10*3/MM3 (ref 0–0.4)
EOSINOPHIL NFR BLD AUTO: 1.4 % (ref 0.3–6.2)
ERYTHROCYTE [DISTWIDTH] IN BLOOD BY AUTOMATED COUNT: 13 % (ref 12.3–15.4)
GLOBULIN UR ELPH-MCNC: 2.3 GM/DL
GLUCOSE SERPL-MCNC: 86 MG/DL (ref 65–99)
HBA1C MFR BLD: 5.6 % (ref 4.8–5.6)
HCT VFR BLD AUTO: 41.6 % (ref 37.5–51)
HDLC SERPL-MCNC: 50 MG/DL (ref 40–60)
HGB BLD-MCNC: 14.6 G/DL (ref 13–17.7)
IMM GRANULOCYTES # BLD AUTO: 0 10*3/MM3 (ref 0–0.05)
IMM GRANULOCYTES NFR BLD AUTO: 0 % (ref 0–0.5)
LDLC SERPL CALC-MCNC: 78 MG/DL (ref 0–100)
LDLC/HDLC SERPL: 1.55 {RATIO}
LYMPHOCYTES # BLD AUTO: 1.64 10*3/MM3 (ref 0.7–3.1)
LYMPHOCYTES NFR BLD AUTO: 32.7 % (ref 19.6–45.3)
MCH RBC QN AUTO: 31.6 PG (ref 26.6–33)
MCHC RBC AUTO-ENTMCNC: 35.1 G/DL (ref 31.5–35.7)
MCV RBC AUTO: 90 FL (ref 79–97)
MONOCYTES # BLD AUTO: 0.5 10*3/MM3 (ref 0.1–0.9)
MONOCYTES NFR BLD AUTO: 10 % (ref 5–12)
NEUTROPHILS NFR BLD AUTO: 2.78 10*3/MM3 (ref 1.7–7)
NEUTROPHILS NFR BLD AUTO: 55.5 % (ref 42.7–76)
NRBC BLD AUTO-RTO: 0 /100 WBC (ref 0–0.2)
PLATELET # BLD AUTO: 257 10*3/MM3 (ref 140–450)
PMV BLD AUTO: 10 FL (ref 6–12)
POTASSIUM SERPL-SCNC: 4.1 MMOL/L (ref 3.5–5.2)
PROT SERPL-MCNC: 7 G/DL (ref 6–8.5)
PSA SERPL-MCNC: 4.9 NG/ML (ref 0–4)
RBC # BLD AUTO: 4.62 10*6/MM3 (ref 4.14–5.8)
SODIUM SERPL-SCNC: 141 MMOL/L (ref 136–145)
TRIGL SERPL-MCNC: 83 MG/DL (ref 0–150)
VLDLC SERPL-MCNC: 16 MG/DL (ref 5–40)
WBC NRBC COR # BLD AUTO: 5.01 10*3/MM3 (ref 3.4–10.8)

## 2024-03-25 PROCEDURE — 80053 COMPREHEN METABOLIC PANEL: CPT | Performed by: FAMILY MEDICINE

## 2024-03-25 PROCEDURE — 83036 HEMOGLOBIN GLYCOSYLATED A1C: CPT | Performed by: FAMILY MEDICINE

## 2024-03-25 PROCEDURE — 85025 COMPLETE CBC W/AUTO DIFF WBC: CPT | Performed by: FAMILY MEDICINE

## 2024-03-25 PROCEDURE — 80061 LIPID PANEL: CPT | Performed by: FAMILY MEDICINE

## 2024-03-25 PROCEDURE — 36415 COLL VENOUS BLD VENIPUNCTURE: CPT | Performed by: FAMILY MEDICINE

## 2024-03-25 PROCEDURE — 84153 ASSAY OF PSA TOTAL: CPT | Performed by: NURSE PRACTITIONER

## 2024-04-02 NOTE — PROGRESS NOTES
Chief Complaint: Elevated PSA    Subjective         History of Present Illness  Gray Ball is a 63 y.o. male presents to [facility] to be seen for follow-up.    Patient was previously seen by me with last visit on 9/29/2023 for elevated PSA.  At that visit we did repeat his PSA as it had gone higher we discussed that we would repeat it in 6 months.  He is here for follow-up.    Occasionally has difficulty with getting stream started- this comes and goes.     PSA  3/25/2024 4.9, PSAd 0.11  10/2/2023 6.98  4/19/2023 4.82    Previous 9/29/2023:  Patient was previously seen by me with last visit on 6/29/2023 for elevated PSA.  We did treat him with him a month-long course of antibiotics and he is here for follow-up.     He has no urinary symptoms.      He has not had his repeat PSA completed, he states he will complete that next week.     Previous 6/29/2023:  Patient was previously seen by me with an elevated PSA with last visit on 5/9/2023. He did have an MRI of the prostate he is here for follow-up.     MRI of the prostate  6/15/2023 MRI reveals benign prostatic hyperplasia without suspicious lesions per PI-RADS 2 area.  Diffuse ill-defined areas of T2 hypointensity throughout the peripheral zone suggesting sequela of prostatitis.        Previous 5/9/2023:  Patient reports he has had elevated testosterone for several years so they have been checking his PSA as well. He did have an elevation, so was sent here for evaluation     He reports he was exposed to jet fuel in drinking water in 1980.    Frequency- admits- but does drink a lot of coffee   Urgency- admits   Incontinence- denies   Perineal pain- denies   Nocturia- occasional   Stream- normal but more effort   Hesitancy- admits   GH- denies    surgeries- denies   Stones- years ago- passed   Family history of  malignancy- Prostate GF at age 85   Cardiopulmonary- denies   Anticoagulants- denies   Smoker- denies     PSA  4/19/2023  4.82  3/22/2023 5.88  9/12/2022 3.95  9/15/2021 3.53  9/14/2020 2.89  9/12/2019 2.81       Objective     Past Medical History:   Diagnosis Date    ADHD (attention deficit hyperactivity disorder) ?    Late 90s    Allergic rhinitis     Anxiety     Arthritis     Broken bones     Both hands/thumbs     Colon cancer screening     Depression     Deviated nasal septum     Ear pain     Lt ear drum ruptured     Elevated ALT measurement 01/11/2015    Elevated PSA     Erectile dysfunction ?    I'm 62 things work just not as well    Forgetfulness     History of migraine headaches     History of psychiatric care 2005    Hypertriglyceridemia 01/11/2015    Hypertrophy of both inferior nasal turbinates     Kidney calculus     Nasal crusting     Nasal obstruction     Night sweats     Prostate cancer screening     9/12/2019-PSA 2.81       Past Surgical History:   Procedure Laterality Date    EYE SURGERY  1975    No depth perception. Lt eye looks off to the Lt side.     FINGER SURGERY  2017    Trapeziectomy     NASAL SEPTUM SURGERY  12/13/2019    ROTATOR CUFF REPAIR  2005    SEPTOPLASTY, RESECTION INFERIOR TURBINATES  12/13/2019    SINUS SURGERY  2021?    Bridge in nose repositioned scar tissue removed o         Current Outpatient Medications:     Diclofenac Sodium (VOLTAREN) 1 % gel gel, Apply 4 g topically to the appropriate area as directed 4 (Four) Times a Day As Needed., Disp: , Rfl:     Lactobacillus (Florajen Acidophilus) capsule, Take 1 capsule by mouth Daily., Disp: 28 capsule, Rfl: 0    multivitamin with minerals tablet tablet, Take 1 tablet by mouth Daily., Disp: , Rfl:     Allergies   Allergen Reactions    Chlorothiazide Unknown - High Severity    Penicillins Unknown - High Severity        Family History   Problem Relation Age of Onset    Brain cancer Mother     Cancer Mother         Brain cancer    Mental illness Mother     Stroke Paternal Grandmother     Stroke Paternal Grandfather     Cancer Father         Cancer  "everywhere    Alcohol abuse Son         Generally addictive       Social History     Socioeconomic History    Marital status:    Tobacco Use    Smoking status: Former     Current packs/day: 0.00     Average packs/day: 2.5 packs/day for 15.0 years (37.5 ttl pk-yrs)     Types: Cigarettes     Start date: 1971     Quit date: 1986     Years since quittin.2     Passive exposure: Past    Smokeless tobacco: Never    Tobacco comments:     Smoked 10 years    Vaping Use    Vaping status: Never Used   Substance and Sexual Activity    Alcohol use: Not Currently    Drug use: Never    Sexual activity: Yes     Partners: Female       Vital Signs:   /71 (BP Location: Left arm, Patient Position: Sitting, Cuff Size: Large Adult)   Pulse 71   Ht 165.1 cm (65\")   Wt 65.1 kg (143 lb 8.3 oz)   BMI 23.88 kg/m²      Physical Exam  Vitals reviewed.   Constitutional:       Appearance: Normal appearance.   Neurological:      General: No focal deficit present.      Mental Status: He is alert and oriented to person, place, and time.   Psychiatric:         Mood and Affect: Mood normal.         Behavior: Behavior normal.          Result Review :   The following data was reviewed by: PARUL Garrett on 2024:     PSA          2023    08:11 10/2/2023    11:15 3/25/2024    11:44   PSA   PSA 4.820  6.980  4.900          Bladder Scan interpretation 2024    Estimation of residual urine via BVI 3000 Verathon Bladder Scan  MA/nurse performing: Radha JORGE MA  Residual Urine: 87 ml  Indication: Elevated prostate specific antigen (PSA)   Position: Supine  Examination: Incremental scanning of the suprapubic area using 2.0 MHz transducer using copious amounts of acoustic gel.   Findings: An anechoic area was demonstrated which represented the bladder, with measurement of residual urine as noted. I inspected this myself. In that the residual urine was stable or insignificant, refer to plan for treatment " and plan necessary at this time.           Procedures        Assessment and Plan    Diagnoses and all orders for this visit:    1. Elevated prostate specific antigen (PSA) (Primary)  -     Bladder Scan  -     PSA DIAGNOSTIC; Future    Given that his PSA did come down, I offered him the options of repeating it in 3 months to see if it is further declining or going back up, or repeating in 6 months.  He would like to repeat this in 6 months.  If it goes higher or stays elevated, we would want to repeat his MRI to see if any new lesions have developed.    At this time he is not interested in taking any medications for BPH since he reports that his urinary hesitancy is only intermittent.    I will see him back in 6 months or sooner.    Follow Up   Return in about 6 months (around 10/3/2024) for with PSA prior.  Patient was given instructions and counseling regarding his condition or for health maintenance advice. Please see specific information pulled into the AVS if appropriate.         This document has been electronically signed by PARUL Garrett  April 3, 2024 14:44 EDT

## 2024-04-03 ENCOUNTER — OFFICE VISIT (OUTPATIENT)
Dept: UROLOGY | Facility: CLINIC | Age: 64
End: 2024-04-03
Payer: MEDICARE

## 2024-04-03 VITALS
HEIGHT: 65 IN | SYSTOLIC BLOOD PRESSURE: 115 MMHG | DIASTOLIC BLOOD PRESSURE: 71 MMHG | HEART RATE: 71 BPM | WEIGHT: 143.52 LBS | BODY MASS INDEX: 23.91 KG/M2

## 2024-04-03 DIAGNOSIS — R97.20 ELEVATED PROSTATE SPECIFIC ANTIGEN (PSA): Primary | ICD-10-CM

## 2024-04-03 LAB — URINE VOLUME: 87

## 2024-08-20 ENCOUNTER — OFFICE VISIT (OUTPATIENT)
Dept: FAMILY MEDICINE CLINIC | Facility: CLINIC | Age: 64
End: 2024-08-20
Payer: MEDICARE

## 2024-08-20 VITALS
TEMPERATURE: 98.1 F | SYSTOLIC BLOOD PRESSURE: 104 MMHG | DIASTOLIC BLOOD PRESSURE: 64 MMHG | HEART RATE: 84 BPM | HEIGHT: 65 IN | OXYGEN SATURATION: 99 % | WEIGHT: 145.2 LBS | BODY MASS INDEX: 24.19 KG/M2

## 2024-08-20 DIAGNOSIS — Z13.6 SCREENING FOR ABDOMINAL AORTIC ANEURYSM: ICD-10-CM

## 2024-08-20 DIAGNOSIS — Z00.00 MEDICARE ANNUAL WELLNESS VISIT, SUBSEQUENT: Primary | ICD-10-CM

## 2024-08-20 DIAGNOSIS — G56.92 NEUROPATHY OF LEFT HAND: ICD-10-CM

## 2024-08-20 DIAGNOSIS — R73.09 ABNORMAL GLUCOSE: ICD-10-CM

## 2024-08-20 DIAGNOSIS — R20.0 NUMBNESS AND TINGLING OF BOTH UPPER EXTREMITIES: ICD-10-CM

## 2024-08-20 DIAGNOSIS — G56.91 NEUROPATHY OF RIGHT HAND: ICD-10-CM

## 2024-08-20 DIAGNOSIS — R97.20 ELEVATED PROSTATE SPECIFIC ANTIGEN (PSA): ICD-10-CM

## 2024-08-20 DIAGNOSIS — M18.12 ARTHRITIS OF CARPOMETACARPAL (CMC) JOINT OF LEFT THUMB: ICD-10-CM

## 2024-08-20 DIAGNOSIS — R20.2 NUMBNESS AND TINGLING OF BOTH UPPER EXTREMITIES: ICD-10-CM

## 2024-08-20 DIAGNOSIS — Z23 NEED FOR PNEUMOCOCCAL VACCINATION: ICD-10-CM

## 2024-08-20 DIAGNOSIS — R41.840 IMPAIRED CONCENTRATION: ICD-10-CM

## 2024-08-20 NOTE — PROGRESS NOTES
Subjective   The ABCs of the Annual Wellness Visit  Medicare Wellness Visit      Gray Ball is a 63 y.o. patient who presents for a Medicare Wellness Visit.    The following portions of the patient's history were reviewed and   updated as appropriate: allergies, current medications, past family history, past medical history, past social history, past surgical history, and problem list.    Compared to one year ago, the patient's physical   health is worse.  Compared to one year ago, the patient's mental   health is worse.    Recent Hospitalizations:  He was not admitted to the hospital during the last year.     Current Medical Providers:  Patient Care Team:  Mikey Evans DO as PCP - General (Family Medicine)  Jillian Cui APRN as Nurse Practitioner (Urology)    Outpatient Medications Prior to Visit   Medication Sig Dispense Refill    multivitamin with minerals tablet tablet Take 1 tablet by mouth Daily.      Diclofenac Sodium (VOLTAREN) 1 % gel gel Apply 4 g topically to the appropriate area as directed 4 (Four) Times a Day As Needed. (Patient not taking: Reported on 8/20/2024)      Lactobacillus (Florajen Acidophilus) capsule Take 1 capsule by mouth Daily. 28 capsule 0     No facility-administered medications prior to visit.     No opioid medication identified on active medication list. I have reviewed chart for other potential  high risk medication/s and harmful drug interactions in the elderly.      Aspirin is not on active medication list.  Aspirin use is not indicated based on review of current medical condition/s. Risk of harm outweighs potential benefits.  .    Patient Active Problem List   Diagnosis    Colon cancer screening    Seborrheic keratosis    Arthritis of carpometacarpal (CMC) joint of left thumb    Screening for abdominal aortic aneurysm     Advance Care Planning Advance Directive is not on file.  ACP discussion was held with the patient during this visit. Patient has an  "advance directive (not in EMR), copy requested.            Objective   Vitals:    24 1322   BP: 104/64   Pulse: 84   Temp: 98.1 °F (36.7 °C)   SpO2: 99%   Weight: 65.9 kg (145 lb 3.2 oz)   Height: 165.1 cm (65\")       Estimated body mass index is 24.16 kg/m² as calculated from the following:    Height as of this encounter: 165.1 cm (65\").    Weight as of this encounter: 65.9 kg (145 lb 3.2 oz).    BMI is within normal parameters. No other follow-up for BMI required.       Does the patient have evidence of cognitive impairment? No                                                                                                Health  Risk Assessment    Smoking Status:  Social History     Tobacco Use   Smoking Status Former    Current packs/day: 0.00    Average packs/day: 2.5 packs/day for 15.0 years (37.5 ttl pk-yrs)    Types: Cigarettes    Start date: 1971    Quit date: 1986    Years since quittin.6    Passive exposure: Past   Smokeless Tobacco Never   Tobacco Comments    Smoked 10 years      Alcohol Consumption:  Social History     Substance and Sexual Activity   Alcohol Use Not Currently       Fall Risk Screen  STEADI Fall Risk Assessment was completed, and patient is at MODERATE risk for falls. Assessment completed on:2024    Depression Screenin/20/2024     1:18 PM   PHQ-2/PHQ-9 Depression Screening   Little Interest or Pleasure in Doing Things 3-->nearly every day   Feeling Down, Depressed or Hopeless 0-->not at all   Trouble Falling or Staying Asleep, or Sleeping Too Much 0-->not at all   Feeling Tired or Having Little Energy 0-->not at all   Poor Appetite or Overeating 0-->not at all   Feeling Bad about Yourself - or that You are a Failure or Have Let Yourself or Your Family Down 0-->not at all   Trouble Concentrating on Things, Such as Reading the Newspaper or Watching Television 3-->nearly every day   Moving or Speaking So Slowly that Other People Could Have Noticed? Or the " Opposite - Being So Fidgety 0-->not at all   Thoughts that You Would be Better Off Dead or of Hurting Yourself in Some Way 0-->not at all   PHQ-9: Brief Depression Severity Measure Score 6   If You Checked Off Any Problems, How Difficult Have These Problems Made It For You to Do Your Work, Take Care of Things at Home, or Get Along with Other People? not difficult at all     Health Habits and Functional and Cognitive Screenin/20/2024     1:13 PM   Functional & Cognitive Status   Do you have difficulty preparing food and eating? No   Do you have difficulty bathing yourself, getting dressed or grooming yourself? No   Do you have difficulty using the toilet? No   Do you have difficulty moving around from place to place? No   Do you have trouble with steps or getting out of a bed or a chair? No   Current Diet Well Balanced Diet   Dental Exam Up to date   Eye Exam Up to date   Exercise (times per week) 0 times per week   Current Exercises Include No Regular Exercise;Yard Work   Do you need help using the phone?  No   Are you deaf or do you have serious difficulty hearing?  No   Do you need help to go to places out of walking distance? No   Do you need help shopping? No   Do you need help preparing meals?  No   Do you need help with housework?  No   Do you need help with laundry? No   Do you need help taking your medications? No   Do you need help managing money? No   Do you ever drive or ride in a car without wearing a seat belt? No   Have you felt unusual stress, anger or loneliness in the last month? Yes   Who do you live with? Spouse   If you need help, do you have trouble finding someone available to you? No   Have you been bothered in the last four weeks by sexual problems? No   Do you have difficulty concentrating, remembering or making decisions? Yes           Age-appropriate Screening Schedule:  Refer to the list below for future screening recommendations based on patient's age, sex and/or medical  conditions. Orders for these recommended tests are listed in the plan section. The patient has been provided with a written plan.    Health Maintenance List  Health Maintenance   Topic Date Due    ZOSTER VACCINE (1 of 2) Never done    Pneumococcal Vaccine 65+ (2 of 2 - PCV) 11/28/2023    COVID-19 Vaccine (7 - 2023-24 season) 02/06/2024    AAA SCREEN (ONE-TIME)  Never done    INFLUENZA VACCINE  08/01/2024    COLORECTAL CANCER SCREENING  10/22/2024    LIPID PANEL  03/25/2025    ANNUAL WELLNESS VISIT  08/20/2025    TDAP/TD VACCINES (2 - Td or Tdap) 05/23/2033    HEPATITIS C SCREENING  Completed                                                                                                                                                CMS Preventative Services Quick Reference  Risk Factors Identified During Encounter  None Identified    The above risks/problems have been discussed with the patient.  Pertinent information has been shared with the patient in the After Visit Summary.  An After Visit Summary and PPPS were made available to the patient.    Follow Up:   Next Medicare Wellness visit to be scheduled in 1 year.         Additional E&M Note during same encounter follows:  Patient has additional, significant, and separately identifiable condition(s)/problem(s) that require work above and beyond the Medicare Wellness Visit     Chief Complaint  Medicare Wellness-subsequent and Follow-up    Subjective   HPI  Gray is also being seen today for additional medical problem/s.        Patient presents today for Medicare annual wellness visit which is subsequent.  He has had some issues with concentration difficulties but mainly describes at times just not caring.  He denies any suicidal lesions.  Depression screening is positive today.  He is not interested in further evaluation at this time.  He is due for pneumococcal 20 vaccination so we discussed getting this done.  He is due for screening for abdominal aortic aneurysm  "so we discussed getting this done as well.  He has quit smoking several decades ago but has smoked over 100 cigarettes in his lifetime and now qualifies that he is 63 to have this evaluated.  He does continue to see urology for elevated PSA and will have follow-up again in October.  He does have CMC arthritis of the left thumb.  He previously received injections on 2 separate occasions.  His last injection was in August 2023.  He would like to consider this in the future again but would like to hold off today.  He does have numbness and tingling in his hands as well as burning which mainly occurs at nighttime.  He describes it from the mid forearm down.  I did discuss previously getting an EMG/nerve conduction study and he is agreeable to getting this done today.  Plan to have labs repeated again prior to next appointment.        Objective   Vital Signs:  /64   Pulse 84   Temp 98.1 °F (36.7 °C)   Ht 165.1 cm (65\")   Wt 65.9 kg (145 lb 3.2 oz)   SpO2 99%   BMI 24.16 kg/m²   Physical Exam  Vitals reviewed.   Constitutional:       Appearance: Normal appearance.   HENT:      Head: Normocephalic and atraumatic.      Right Ear: External ear normal.      Left Ear: External ear normal.   Eyes:      Conjunctiva/sclera: Conjunctivae normal.   Cardiovascular:      Rate and Rhythm: Normal rate and regular rhythm.      Heart sounds: No murmur heard.     No friction rub. No gallop.   Pulmonary:      Effort: Pulmonary effort is normal.      Breath sounds: Normal breath sounds. No wheezing or rhonchi.   Abdominal:      General: Bowel sounds are normal. There is no distension.      Palpations: Abdomen is soft.      Tenderness: There is no abdominal tenderness.   Skin:     General: Skin is warm and dry.   Neurological:      Mental Status: He is alert and oriented to person, place, and time.      Cranial Nerves: No cranial nerve deficit.      Comments: Positive Phalen's, reverse Phalen's and Tinel's testing bilaterally " noted.   strength is decreased in the left hand 4/5.   Psychiatric:         Mood and Affect: Mood and affect normal.         Behavior: Behavior normal.         Thought Content: Thought content normal.         Judgment: Judgment normal.                 Assessment and Plan               Medicare annual wellness visit, subsequent    Impaired concentration    Need for pneumococcal vaccination    Screening for abdominal aortic aneurysm    Elevated prostate specific antigen (PSA)    Neuropathy of left hand    Neuropathy of right hand    Numbness and tingling of both upper extremities    Abnormal glucose    Arthritis of carpometacarpal (CMC) joint of left thumb      Orders Placed This Encounter   Procedures    US AAA Screen Limited     Standing Status:   Future     Standing Expiration Date:   8/20/2025     Order Specific Question:   Is the patient a male between 65-75 years old and have smoked at least 100 cigarettes in their lifetime OR a male or female Medicare patient who has a family history of abdominal aoritc aneurysm?     Answer:   Yes     Order Specific Question:   Reason for Exam:     Answer:   screening     Order Specific Question:   Release to patient     Answer:   Routine Release [[id number]]    Comprehensive Metabolic Panel     Standing Status:   Future     Standing Expiration Date:   8/20/2025     Order Specific Question:   Release to patient     Answer:   Routine Release [[id number]]    Lipid Panel     Standing Status:   Future     Standing Expiration Date:   8/20/2025     Order Specific Question:   Release to patient     Answer:   Routine Release [[id number]]    Hemoglobin A1c     Standing Status:   Future     Standing Expiration Date:   8/20/2025     Order Specific Question:   Release to patient     Answer:   Routine Release [[id number]]    EMG & Nerve Conduction Test     Standing Status:   Future     Standing Expiration Date:   8/20/2025     Order Specific Question:   Extremity     Answer:   upper  extremities     Order Specific Question:   Please specify number of extremities:     Answer:   2 Extremities     Order Specific Question:   Reason for Exam:     Answer:   concern for carpal tunnel     Order Specific Question:   Release to patient     Answer:   Routine Release [[id number]]    CBC & Differential     Standing Status:   Future     Standing Expiration Date:   8/20/2025     Order Specific Question:   Manual Differential     Answer:   No     Order Specific Question:   Release to patient     Answer:   Routine Release [[id number]]   I discussed with patient the plan as documented above.  Plan to see him back in 6 months.  Labs have been ordered to be completed prior to next appointment.  Orders have been placed today.  Patient is directed to call with any questions or concerns.          Follow Up   Return in about 6 months (around 2/20/2025) for check up.  Patient was given instructions and counseling regarding his condition or for health maintenance advice. Please see specific information pulled into the AVS if appropriate.

## 2024-09-12 ENCOUNTER — HOSPITAL ENCOUNTER (OUTPATIENT)
Dept: ULTRASOUND IMAGING | Facility: HOSPITAL | Age: 64
Discharge: HOME | End: 2024-09-12
Payer: MEDICARE

## 2024-09-12 ENCOUNTER — HOSPITAL ENCOUNTER (OUTPATIENT)
Facility: HOSPITAL | Age: 64
Discharge: HOME | End: 2024-09-12
Payer: MEDICARE

## 2024-09-12 DIAGNOSIS — Z13.6 SCREENING FOR ABDOMINAL AORTIC ANEURYSM: ICD-10-CM

## 2024-09-12 DIAGNOSIS — R20.0 NUMBNESS AND TINGLING OF BOTH UPPER EXTREMITIES: ICD-10-CM

## 2024-09-12 DIAGNOSIS — G56.91 NEUROPATHY OF RIGHT HAND: ICD-10-CM

## 2024-09-12 DIAGNOSIS — G56.92 NEUROPATHY OF LEFT HAND: ICD-10-CM

## 2024-09-12 DIAGNOSIS — R20.2 NUMBNESS AND TINGLING OF BOTH UPPER EXTREMITIES: ICD-10-CM

## 2024-09-12 PROCEDURE — 95911 NRV CNDJ TEST 9-10 STUDIES: CPT

## 2024-09-12 PROCEDURE — 76706 US ABDL AORTA SCREEN AAA: CPT

## 2024-09-12 PROCEDURE — 95886 MUSC TEST DONE W/N TEST COMP: CPT

## 2024-09-30 ENCOUNTER — CLINICAL SUPPORT (OUTPATIENT)
Dept: FAMILY MEDICINE CLINIC | Facility: CLINIC | Age: 64
End: 2024-09-30
Payer: MEDICARE

## 2024-09-30 DIAGNOSIS — R97.20 ELEVATED PROSTATE SPECIFIC ANTIGEN (PSA): ICD-10-CM

## 2024-09-30 LAB — PSA SERPL-MCNC: 5.36 NG/ML (ref 0–4)

## 2024-09-30 PROCEDURE — 84153 ASSAY OF PSA TOTAL: CPT | Performed by: NURSE PRACTITIONER

## 2024-09-30 PROCEDURE — 36415 COLL VENOUS BLD VENIPUNCTURE: CPT | Performed by: FAMILY MEDICINE

## 2024-10-02 NOTE — PROGRESS NOTES
Chief Complaint: Elevated PSA    Subjective         History of Present Illness  Gray Ball is a 63 y.o. male presents to [facility] to be seen for follow-up.    Patient was previously seen by me with last visit on 4/3/2024 for elevated PSA.  At that visit he wanted to repeat his PSA in 6 months to see if it had gone any higher.  We also discussed the possibility of repeating his MRI.  He is here for follow-up.    Denies any urinary symptoms at this time.    PSA  9/30/2024 5.36, PSAd 0.12  Prostate volume: 46cc    Previous 4/3/2024:  Patient was previously seen by me with last visit on 9/29/2023 for elevated PSA.  At that visit we did repeat his PSA as it had gone higher we discussed that we would repeat it in 6 months.  He is here for follow-up.     Occasionally has difficulty with getting stream started- this comes and goes.      PSA  3/25/2024 4.9, PSAd 0.11  10/2/2023 6.98  4/19/2023 4.82     Previous 9/29/2023:  Patient was previously seen by me with last visit on 6/29/2023 for elevated PSA.  We did treat him with him a month-long course of antibiotics and he is here for follow-up.     He has no urinary symptoms.      He has not had his repeat PSA completed, he states he will complete that next week.     Previous 6/29/2023:  Patient was previously seen by me with an elevated PSA with last visit on 5/9/2023. He did have an MRI of the prostate he is here for follow-up.     MRI of the prostate  6/15/2023 MRI reveals benign prostatic hyperplasia without suspicious lesions per PI-RADS 2 area.  Diffuse ill-defined areas of T2 hypointensity throughout the peripheral zone suggesting sequela of prostatitis.        Previous 5/9/2023:  Patient reports he has had elevated testosterone for several years so they have been checking his PSA as well. He did have an elevation, so was sent here for evaluation     He reports he was exposed to jet fuel in drinking water in 1980.    Frequency- admits-  but does drink a lot of coffee   Urgency- admits   Incontinence- denies   Perineal pain- denies   Nocturia- occasional   Stream- normal but more effort   Hesitancy- admits   GH- denies    surgeries- denies   Stones- years ago- passed   Family history of  malignancy- Prostate GF at age 85   Cardiopulmonary- denies   Anticoagulants- denies   Smoker- denies     PSA  4/19/2023 4.82  3/22/2023 5.88  9/12/2022 3.95  9/15/2021 3.53  9/14/2020 2.89  9/12/2019 2.81          Objective     Past Medical History:   Diagnosis Date    ADHD (attention deficit hyperactivity disorder) ?    Late 90s    Allergic rhinitis     Anxiety     Arthritis     Broken bones     Both hands/thumbs     Colon cancer screening     Depression     Deviated nasal septum     Ear pain     Lt ear drum ruptured     Elevated ALT measurement 01/11/2015    Elevated PSA     Erectile dysfunction ?    I'm 62 things work just not as well    Forgetfulness     History of migraine headaches     History of psychiatric care 2005    Hypertriglyceridemia 01/11/2015    Hypertrophy of both inferior nasal turbinates     Kidney calculus     Nasal crusting     Nasal obstruction     Night sweats     Prostate cancer screening     9/12/2019-PSA 2.81       Past Surgical History:   Procedure Laterality Date    EYE SURGERY  1975    No depth perception. Lt eye looks off to the Lt side.     FINGER SURGERY  2017    Trapeziectomy     NASAL SEPTUM SURGERY  12/13/2019    ROTATOR CUFF REPAIR  2005    SEPTOPLASTY, RESECTION INFERIOR TURBINATES  12/13/2019    SINUS SURGERY  2021?    Bridge in nose repositioned scar tissue removed o         Current Outpatient Medications:     multivitamin with minerals tablet tablet, Take 1 tablet by mouth Daily., Disp: , Rfl:     Diclofenac Sodium (VOLTAREN) 1 % gel gel, Apply 4 g topically to the appropriate area as directed 4 (Four) Times a Day As Needed. (Patient not taking: Reported on 8/20/2024), Disp: , Rfl:     Allergies   Allergen Reactions     "Chlorothiazide Unknown - High Severity    Penicillins Unknown - High Severity        Family History   Problem Relation Age of Onset    Brain cancer Mother     Cancer Mother         Brain cancer    Mental illness Mother     Stroke Paternal Grandmother     Stroke Paternal Grandfather     Cancer Father         Cancer everywhere    Alcohol abuse Son         Generally addictive       Social History     Socioeconomic History    Marital status:    Tobacco Use    Smoking status: Former     Current packs/day: 0.00     Average packs/day: 2.5 packs/day for 15.0 years (37.5 ttl pk-yrs)     Types: Cigarettes     Start date: 1971     Quit date: 1986     Years since quittin.7     Passive exposure: Past    Smokeless tobacco: Never    Tobacco comments:     Smoked 10 years    Vaping Use    Vaping status: Never Used   Substance and Sexual Activity    Alcohol use: Not Currently    Drug use: Never    Sexual activity: Yes     Partners: Female       Vital Signs:   /74 (BP Location: Left arm, Patient Position: Sitting, Cuff Size: Adult)   Pulse 64   Ht 165.1 cm (65\")   Wt 65.9 kg (145 lb 4.5 oz)   BMI 24.18 kg/m²      Physical Exam  Vitals reviewed.   Constitutional:       Appearance: Normal appearance.   Neurological:      General: No focal deficit present.      Mental Status: He is alert and oriented to person, place, and time.   Psychiatric:         Mood and Affect: Mood normal.         Behavior: Behavior normal.          Result Review :   The following data was reviewed by: PARUL Garrett on 10/03/2024:  Results for orders placed or performed in visit on 10/03/24   Bladder Scan   Result Value Ref Range    Urine Volume 0       PSA          3/25/2024    11:44 2024    12:08   PSA   PSA 4.900  5.360      Bladder Scan interpretation 10/03/2024    Estimation of residual urine via BVI 3000 Verathon Bladder Scan  MA/nurse performing: Radha JORGE MA  Residual Urine: 0 ml  Indication: Elevated " prostate specific antigen (PSA)   Position: Supine  Examination: Incremental scanning of the suprapubic area using 2.0 MHz transducer using copious amounts of acoustic gel.   Findings: An anechoic area was demonstrated which represented the bladder, with measurement of residual urine as noted. I inspected this myself. In that the residual urine was stable or insignificant, refer to plan for treatment and plan necessary at this time.           Procedures        Assessment and Plan    Diagnoses and all orders for this visit:    1. Elevated prostate specific antigen (PSA) (Primary)  -     Bladder Scan  -     MRI Prostate With & Without Contrast; Future    Given that has been over a year since his previous MRI, we will get him scheduled for a repeat MRI since the PSA did go up again.  We did discuss that his PSA density is still good so hopefully the MRI will also come out negative.    I will plan to see him back 1 week after the MRI to go over the results and come up with a plan.    Follow Up   No follow-ups on file.  Patient was given instructions and counseling regarding his condition or for health maintenance advice. Please see specific information pulled into the AVS if appropriate.         This document has been electronically signed by PARUL Garrett  October 3, 2024 13:24 EDT

## 2024-10-03 ENCOUNTER — OFFICE VISIT (OUTPATIENT)
Dept: UROLOGY | Facility: CLINIC | Age: 64
End: 2024-10-03
Payer: MEDICARE

## 2024-10-03 VITALS
HEIGHT: 65 IN | SYSTOLIC BLOOD PRESSURE: 110 MMHG | DIASTOLIC BLOOD PRESSURE: 74 MMHG | HEART RATE: 64 BPM | WEIGHT: 145.28 LBS | BODY MASS INDEX: 24.21 KG/M2

## 2024-10-03 DIAGNOSIS — R97.20 ELEVATED PROSTATE SPECIFIC ANTIGEN (PSA): Primary | ICD-10-CM

## 2024-10-03 LAB — URINE VOLUME: 0

## 2024-10-09 ENCOUNTER — OFFICE VISIT (OUTPATIENT)
Dept: FAMILY MEDICINE CLINIC | Facility: CLINIC | Age: 64
End: 2024-10-09
Payer: MEDICARE

## 2024-10-09 VITALS
HEIGHT: 64 IN | WEIGHT: 142.7 LBS | OXYGEN SATURATION: 99 % | TEMPERATURE: 98 F | DIASTOLIC BLOOD PRESSURE: 66 MMHG | HEART RATE: 67 BPM | SYSTOLIC BLOOD PRESSURE: 120 MMHG | BODY MASS INDEX: 24.36 KG/M2

## 2024-10-09 DIAGNOSIS — M18.12 ARTHRITIS OF CARPOMETACARPAL (CMC) JOINT OF LEFT THUMB: Primary | ICD-10-CM

## 2024-10-09 DIAGNOSIS — G89.29 CHRONIC RIGHT SHOULDER PAIN: ICD-10-CM

## 2024-10-09 DIAGNOSIS — R20.2 NUMBNESS AND TINGLING OF BOTH UPPER EXTREMITIES: ICD-10-CM

## 2024-10-09 DIAGNOSIS — R20.0 NUMBNESS AND TINGLING OF BOTH UPPER EXTREMITIES: ICD-10-CM

## 2024-10-09 DIAGNOSIS — M25.511 CHRONIC RIGHT SHOULDER PAIN: ICD-10-CM

## 2024-10-09 PROCEDURE — 99214 OFFICE O/P EST MOD 30 MIN: CPT | Performed by: STUDENT IN AN ORGANIZED HEALTH CARE EDUCATION/TRAINING PROGRAM

## 2024-10-09 RX ORDER — NAPROXEN 500 MG/1
500 TABLET ORAL 2 TIMES DAILY WITH MEALS
Qty: 28 TABLET | Refills: 0 | Status: SHIPPED | OUTPATIENT
Start: 2024-10-09

## 2024-10-09 NOTE — PROGRESS NOTES
"Chief Complaint  Pain (Right shoulder pain x years/Left wrist pain x years/Bump left big toe x 2 wks)    Subjective      Gray Ball is a 63 y.o. male who presents to [facility]     History of Present Illness  The patient presents for evaluation of multiple medical concerns. He is accompanied by his wife.    He reports issues with his hands, including pain in the PIP knuckles and pain in his left thumb. He experiences a dull burning sensation in his hands at night, which sometimes wakes him up.  He has received 2 CMC injections of the left thumb and he would like to look into another injection.  He recently had an EMG which was normal.  Currently he is using Voltaren gel which is helpful for pain.    He also is having problems with his left great toe.  He noticed a growth on the medial aspect of the MTP.  This is painful to palpation and abnormal.    He also reports a grinding sensation in his right shoulder, which has previously undergone surgery to remove bone spurs and repair a rotator cuff tear.  Recently he was stirring concrete and his right shoulder started to hurt in the posterior aspect.  Since then this pain has been waking him up at night.         Objective   Vital Signs:   Vitals:    10/09/24 1406   BP: 120/66   BP Location: Left arm   Patient Position: Sitting   Pulse: 67   Temp: 98 °F (36.7 °C)   TempSrc: Oral   SpO2: 99%   Weight: 64.7 kg (142 lb 11.2 oz)   Height: 162.6 cm (64\")     Body mass index is 24.49 kg/m².    Wt Readings from Last 3 Encounters:   10/09/24 64.7 kg (142 lb 11.2 oz)   10/03/24 65.9 kg (145 lb 4.5 oz)   08/20/24 65.9 kg (145 lb 3.2 oz)     BP Readings from Last 3 Encounters:   10/09/24 120/66   10/03/24 110/74   08/20/24 104/64       Health Maintenance   Topic Date Due    ZOSTER VACCINE (1 of 2) Never done    COVID-19 Vaccine (7 - 2023-24 season) 09/01/2024    COLORECTAL CANCER SCREENING  10/22/2024    LIPID PANEL  03/25/2025    ANNUAL " WELLNESS VISIT  08/20/2025    TDAP/TD VACCINES (2 - Td or Tdap) 05/23/2033    HEPATITIS C SCREENING  Completed    INFLUENZA VACCINE  Completed    Pneumococcal Vaccine 65+  Completed    AAA SCREEN (ONE-TIME)  Completed       Physical Exam  HENT:      Head: Normocephalic and atraumatic.      Nose: Nose normal.      Mouth/Throat:      Mouth: Mucous membranes are moist.   Eyes:      Extraocular Movements: Extraocular movements intact.      Conjunctiva/sclera: Conjunctivae normal.   Cardiovascular:      Rate and Rhythm: Normal rate and regular rhythm.      Heart sounds: No murmur heard.     No friction rub. No gallop.   Pulmonary:      Effort: No respiratory distress.      Breath sounds: No wheezing, rhonchi or rales.   Abdominal:      General: Abdomen is flat. There is no distension.   Musculoskeletal:         General: No swelling.      Cervical back: Neck supple.      Comments: Pain to palpation of the left CMC joint of the thumb. Pain noted on the right shoulder with anterior resisted flexion as well as abduction. External rotation is painful on the right. Faulkner's test is positive on the right.    There is no lump palpated on the left MTP area where patient points he had the lump in the past.   Skin:     General: Skin is warm and dry.   Neurological:      General: No focal deficit present.      Mental Status: He is alert and oriented to person, place, and time.   Psychiatric:         Mood and Affect: Mood normal.         Behavior: Behavior normal.         Thought Content: Thought content normal.         Judgment: Judgment normal.          Physical Exam  Pain noted on the right shoulder with anterior resisted flexion as well as abduction. External rotation is painful on the right. Faulkner's test is positive on the right.    Result Review :  The following data was reviewed by: Perry Chong DO on 10/09/2024:    US aaa screen limited    Result Date: 9/12/2024  Impression: The proximal abdominal aorta is ectatic,  measuring up to 2.8 cm. The Society for vascular surgery recommends rescreening and 10 years for aortic diameter in this size range. Electronically Signed: Chiki Muñoz MD  9/12/2024 11:15 AM EDT  Workstation ID: [id number]       Results  Testing  Nerve conduction study showed no evidence of neuropathy.       Procedures          Diagnoses and all orders for this visit:    1. Arthritis of carpometacarpal (CMC) joint of left thumb (Primary)  -     naproxen (Naprosyn) 500 MG tablet; Take 1 tablet by mouth 2 (Two) Times a Day With Meals.  Dispense: 28 tablet; Refill: 0    2. Chronic right shoulder pain  -     naproxen (Naprosyn) 500 MG tablet; Take 1 tablet by mouth 2 (Two) Times a Day With Meals.  Dispense: 28 tablet; Refill: 0  -     XR Shoulder 2+ View Right; Future    3. Numbness and tingling of both upper extremities         Assessment & Plan  1. Osteoarthritis.  The patient's symptoms suggest osteoarthritis, particularly in the left thumb and right shoulder, possibly exacerbated by inflamed tendons or a partial tendon tear. He reports pain in the left CMC area and the right shoulder, which worsens with certain movements. A prescription for naproxen, to be taken twice daily as needed with meals, was provided to alleviate inflammation. He was advised to apply Voltaren gel to his shoulder and to take Tylenol 1000 mg three times daily as needed although not to take Voltaren gel and naproxen at the same time.  An x-ray was ordered to further assess the condition. A follow-up with Dr. Evans was recommended to discuss a possible CMC injection.  His x-ray of the right shows arthritis patient may consider steroid injection in the shoulder.  He was instructed to take naproxen for the next 2 to 3 days, regardless of pain presence, to reduce overall inflammation and then take it as needed for pain.      2. Left toe lump.  The patient reports a lump on his left toe causing discomfort, possibly due to inflammation from tendon  irritation. He was advised that the lump might be a nodule from inflammation and should monitor it for changes. No immediate intervention was recommended.        BMI is within normal parameters. No other follow-up for BMI required.       I spent 32 minutes caring for Gray on this date of service. This time includes time spent by me in the following activities:preparing for the visit, reviewing tests, obtaining and/or reviewing a separately obtained history, performing a medically appropriate examination and/or evaluation , counseling and educating the patient/family/caregiver, and documenting information in the medical record  FOLLOW UP  Return for Next scheduled follow up.  Patient was given instructions and counseling regarding his condition or for health maintenance advice. Please see specific information pulled into the AVS if appropriate.     Patient or patient representative verbalized consent for the use of Ambient Listening during the visit with  Perry Chong DO for chart documentation. 10/9/2024  14:48 EDT    Perry Chong DO  10/09/24  14:49 EDT    CURRENT & DISCONTINUED MEDICATIONS  Current Outpatient Medications   Medication Instructions    Diclofenac Sodium (VOLTAREN) 4 g, 4 Times Daily PRN    multivitamin with minerals tablet tablet 1 tablet, Oral, Daily    naproxen (NAPROSYN) 500 mg, Oral, 2 Times Daily With Meals       There are no discontinued medications.

## 2024-10-10 ENCOUNTER — HOSPITAL ENCOUNTER (OUTPATIENT)
Dept: GENERAL RADIOLOGY | Facility: HOSPITAL | Age: 64
Discharge: HOME | End: 2024-10-10
Admitting: STUDENT IN AN ORGANIZED HEALTH CARE EDUCATION/TRAINING PROGRAM
Payer: MEDICARE

## 2024-10-10 DIAGNOSIS — G89.29 CHRONIC RIGHT SHOULDER PAIN: ICD-10-CM

## 2024-10-10 DIAGNOSIS — M25.511 CHRONIC RIGHT SHOULDER PAIN: ICD-10-CM

## 2024-10-10 PROCEDURE — 73030 X-RAY EXAM OF SHOULDER: CPT

## 2024-11-20 ENCOUNTER — OFFICE VISIT (OUTPATIENT)
Dept: FAMILY MEDICINE CLINIC | Facility: CLINIC | Age: 64
End: 2024-11-20
Payer: MEDICARE

## 2024-11-20 VITALS
BODY MASS INDEX: 25.08 KG/M2 | WEIGHT: 146.9 LBS | TEMPERATURE: 97.7 F | DIASTOLIC BLOOD PRESSURE: 60 MMHG | SYSTOLIC BLOOD PRESSURE: 122 MMHG | HEART RATE: 70 BPM | OXYGEN SATURATION: 100 % | HEIGHT: 64 IN

## 2024-11-20 DIAGNOSIS — M25.511 CHRONIC RIGHT SHOULDER PAIN: ICD-10-CM

## 2024-11-20 DIAGNOSIS — M18.12 ARTHRITIS OF CARPOMETACARPAL (CMC) JOINT OF LEFT THUMB: Primary | ICD-10-CM

## 2024-11-20 DIAGNOSIS — R97.20 ELEVATED PSA: ICD-10-CM

## 2024-11-20 DIAGNOSIS — G89.29 CHRONIC RIGHT SHOULDER PAIN: ICD-10-CM

## 2024-11-20 PROCEDURE — 99213 OFFICE O/P EST LOW 20 MIN: CPT | Performed by: FAMILY MEDICINE

## 2024-11-20 PROCEDURE — 20600 DRAIN/INJ JOINT/BURSA W/O US: CPT | Performed by: FAMILY MEDICINE

## 2024-11-20 PROCEDURE — 20610 DRAIN/INJ JOINT/BURSA W/O US: CPT | Performed by: FAMILY MEDICINE

## 2024-11-20 RX ORDER — TRIAMCINOLONE ACETONIDE 40 MG/ML
10 INJECTION, SUSPENSION INTRA-ARTICULAR; INTRAMUSCULAR
Status: COMPLETED | OUTPATIENT
Start: 2024-11-20 | End: 2024-11-20

## 2024-11-20 RX ORDER — LIDOCAINE HYDROCHLORIDE 10 MG/ML
0.25 INJECTION, SOLUTION INFILTRATION; PERINEURAL
Status: COMPLETED | OUTPATIENT
Start: 2024-11-20 | End: 2024-11-20

## 2024-11-20 RX ORDER — TRIAMCINOLONE ACETONIDE 40 MG/ML
40 INJECTION, SUSPENSION INTRA-ARTICULAR; INTRAMUSCULAR
Status: COMPLETED | OUTPATIENT
Start: 2024-11-20 | End: 2024-11-20

## 2024-11-20 RX ORDER — LIDOCAINE HYDROCHLORIDE 10 MG/ML
2 INJECTION, SOLUTION INFILTRATION; PERINEURAL
Status: COMPLETED | OUTPATIENT
Start: 2024-11-20 | End: 2024-11-20

## 2024-11-20 RX ADMIN — TRIAMCINOLONE ACETONIDE 40 MG: 40 INJECTION, SUSPENSION INTRA-ARTICULAR; INTRAMUSCULAR at 14:25

## 2024-11-20 RX ADMIN — LIDOCAINE HYDROCHLORIDE 2 ML: 10 INJECTION, SOLUTION INFILTRATION; PERINEURAL at 14:25

## 2024-11-20 RX ADMIN — LIDOCAINE HYDROCHLORIDE 0.25 ML: 10 INJECTION, SOLUTION INFILTRATION; PERINEURAL at 14:20

## 2024-11-20 RX ADMIN — TRIAMCINOLONE ACETONIDE 10 MG: 40 INJECTION, SUSPENSION INTRA-ARTICULAR; INTRAMUSCULAR at 14:20

## 2024-11-20 NOTE — PROGRESS NOTES
"Chief Complaint  Hand Pain (Left thumb pain /) and Shoulder Pain (Right shoulder  pain /)    Subjective          Gray Ball presents to [facility]    History of Present Illness     History of Present Illness  The patient is a 63-year-old male who presents today to discuss left thumb pain.    He is here for an injection to alleviate the pain in his left thumb, which is due to CMC arthritis. His last injection was administered on 03/13/2023.    He also reports a grinding sensation in his right shoulder. This shoulder has previously undergone surgery for bone spur removal and rotator cuff repair approximately 10 years ago. The pain, which began after stirring concrete, is severe enough to disrupt his sleep. He has not received any injections for his shoulder pain.    Additionally, he had an MRI of his prostate.         Current Outpatient Medications   Medication Instructions    Diclofenac Sodium (VOLTAREN) 4 g, 4 Times Daily PRN    multivitamin with minerals tablet tablet 1 tablet, Daily    naproxen (NAPROSYN) 500 mg, Oral, 2 Times Daily With Meals       The following portions of the patient's history were reviewed and updated as appropriate: allergies, current medications, past family history, past medical history, past social history, past surgical history, and problem list.    Objective   Vital Signs:   /60 (BP Location: Left arm, Patient Position: Sitting, Cuff Size: Adult)   Pulse 70   Temp 97.7 °F (36.5 °C) (Oral)   Ht 162.6 cm (64\")   Wt 66.6 kg (146 lb 14.4 oz)   SpO2 100%   BMI 25.22 kg/m²     BP Readings from Last 3 Encounters:   11/20/24 122/60   10/09/24 120/66   10/03/24 110/74     Wt Readings from Last 3 Encounters:   11/20/24 66.6 kg (146 lb 14.4 oz)   10/09/24 64.7 kg (142 lb 11.2 oz)   10/03/24 65.9 kg (145 lb 4.5 oz)     BMI is >= 25 and <30. (Overweight) The following options were offered after discussion;: exercise counseling/recommendations and " nutrition counseling/recommendations     Physical Exam  Vitals reviewed.   Constitutional:       Appearance: Normal appearance.   HENT:      Head: Normocephalic and atraumatic.      Right Ear: External ear normal.      Left Ear: External ear normal.   Eyes:      Conjunctiva/sclera: Conjunctivae normal.   Cardiovascular:      Rate and Rhythm: Normal rate and regular rhythm.      Heart sounds: No murmur heard.     No friction rub. No gallop.   Pulmonary:      Effort: Pulmonary effort is normal.      Breath sounds: Normal breath sounds. No wheezing or rhonchi.   Abdominal:      General: Bowel sounds are normal. There is no distension.      Palpations: Abdomen is soft.      Tenderness: There is no abdominal tenderness.   Musculoskeletal:      Comments: Positive thumb CMC grind test noted for the left hand.  Positive empty can test with positive [place] liftoff testing and external rotation testing for the right shoulder.  There is crepitus noted with active and passive range of motion.  There is no tenderness to palpation over the AC joint of the right shoulder.   Skin:     General: Skin is warm and dry.   Neurological:      Mental Status: He is alert and oriented to person, place, and time.      Cranial Nerves: No cranial nerve deficit.   Psychiatric:         Mood and Affect: Mood and affect normal.         Behavior: Behavior normal.         Thought Content: Thought content normal.         Judgment: Judgment normal.            Result Review :   The following data was reviewed by: Mikey Evans DO on 11/20/2024:  Common labs          3/25/2024    11:44 9/30/2024    12:08   Common Labs   Glucose 86     BUN 10     Creatinine 1.04     Sodium 141     Potassium 4.1     Chloride 107     Calcium 9.7     Albumin 4.7     Total Bilirubin 0.4     Alkaline Phosphatase 83     AST (SGOT) 15     ALT (SGPT) 31     WBC 5.01     Hemoglobin 14.6     Hematocrit 41.6     Platelets 257     Total Cholesterol 144     Triglycerides 83      HDL Cholesterol 50     LDL Cholesterol  78     Hemoglobin A1C 5.60     PSA 4.900  5.360             Lab Results   Component Value Date    SARSANTIGEN Detected (A) 03/30/2023    RAPFLUA Negative 03/30/2023    RAPFLUB Negative 03/30/2023       Results  Imaging  X-ray of left hand on 9/13/2022 showed prominent osteoarthritis of the first CMC joint. X-ray of right shoulder on 10/10/2024 showed degenerative changes of the AC joint at the attachment and at the attachment of the rotator cuff, greater tuberosity. MRI of the prostate showed no MR evidence of clinically significant prostate malignancy, stable chronic prostatitis in the peripheral zone with benign prostatic hyperplasia.    Arthrocentesis    Date/Time: 11/20/2024 2:20 PM    Performed by: Mikey Evans DO  Authorized by: Mikey Evans DO  Indications: pain   Location: left 1st cmc joint arthritis.  Preparation: Patient was prepped and draped in the usual sterile fashion.  Needle gauge: 25.  Meds administered: 0.25 mL lidocaine 1 %; 10 mg triamcinolone acetonide 40 MG/ML  Patient tolerance: patient tolerated the procedure well with no immediate complications  Comments: Patient provided verbal and written consent for a corticosteroid injection for the first CMC joint of the left hand.      Arthrocentesis    Date/Time: 11/20/2024 2:25 PM    Performed by: Mikey Evans DO  Authorized by: Mikey Evans DO  Indications: pain   Body area: shoulder  Joint: right shoulder  Preparation: Patient was prepped and draped in the usual sterile fashion.  Needle gauge: 25.  Approach: posterior  Meds administered: 2 mL lidocaine 1 %; 40 mg triamcinolone acetonide 40 MG/ML  Patient tolerance: patient tolerated the procedure well with no immediate complications  Comments: Patient provided verbal and written consent for corticosteroid injection for the right shoulder.              Assessment and Plan    Diagnoses and all orders for this visit:    1. Arthritis of  carpometacarpal (CMC) joint of left thumb (Primary)  -     Arthrocentesis    2. Chronic right shoulder pain  -     Arthrocentesis    3. Elevated PSA        Assessment & Plan  1. Left thumb pain.  The patient has CMC arthritis of the left thumb, confirmed by an x-ray on 9/13/2022 showing prominent osteoarthritis of the first CMC joint of the left hand. He previously had an injection on 3/13/2023, which provided relief for about a year and a half. A first CMC joint injection will be administered today to alleviate the pain.  Patient is directed to call with any questions or concerns.    2. Right shoulder pain.  The patient reports a grinding sensation and pain in the right shoulder, which wakes him up at night. He previously had surgery to remove bone spurs and repair the rotator cuff. An x-ray on 10/10/2024 showed degenerative changes of the AC joint and at the attachment of the rotator cuff, greater tuberosity. A right shoulder injection will be administered today. If the pain persists, an MRI of the right shoulder will be considered. He prefers to hold off on physical therapy for now.  Patient instructed to call with any questions or concerns.    3. Chronic prostatitis.  The patient has chronic stable prostatitis in the peripheral zone with benign prostatic hyperplasia, as indicated by a recent MRI of the prostate. He has an elevated PSA but no MR evidence of clinically significant prostate malignancy. He will continue to be monitored by his urologist.    Follow-up  Patient is scheduled for a follow-up visit in February 2024.       There are no discontinued medications.       Follow Up   Return if symptoms worsen or fail to improve.  Patient was given instructions and counseling regarding his condition or for health maintenance advice. Please see specific information pulled into the AVS if appropriate.     Patient or patient representative verbalized consent for the use of Ambient Listening during the visit with   Mikey Evans DO for chart documentation. 11/20/2024  14:34 EST    Mikey Evans DO  11/20/24  14:35 EST

## 2024-11-25 NOTE — PROGRESS NOTES
Chief Complaint: Elevated PSA    Subjective         History of Present Illness  Gray Ball is a 63 y.o. male presents to [facility] to be seen for follow-up.    Patient was previously seen by me with last visit on 10/3/2024 for elevated PSA.  We did schedule him for an MRI of the prostate and he is here to go over those results.    Denies any bothersome urinary symptoms.     PSAd 0.09      EXAMINATION: MRI PROSTATE WO AND W CONTRAST     ACCESSION NUMBER: [id number]     DATE: 11/12/2024 10:22 AM     HISTORY: Elevated PSA PSA level 5.36     COMPARISON: MRI prostate 6/15/2023     FINDINGS:     Prostate size: 4.4 cm x 5.4 cm x 4.8 cm for a total volume of 59 mL.   PSA density 0.09     Peripheral zone: Stable regions of ill-defined low T2 signal in the   peripheral zone compatible with chronic prostatitis. No new   abnormality.     Transition zone: There are changes related to benign prostatic   hypertrophy. The gland protrudes superiorly into the bladder base.     Extracapsular extension: There is no evidence of extracapsular   extension.     The seminal vesicles and neurovascular bundles are normal. No pelvic   lymphadenopathy. Bladder wall thickening compatible with outlet   obstruction.     IMPRESSION:     1. No MR evidence for clinically significant prostate   malignancy.Stable chronic prostatitis in the peripheral zone with   benign prostatic hyperplasia.     Dictated by: Bell Billy M.D.     Previous 10/3/2024:  Patient was previously seen by me with last visit on 4/3/2024 for elevated PSA.  At that visit he wanted to repeat his PSA in 6 months to see if it had gone any higher.  We also discussed the possibility of repeating his MRI.  He is here for follow-up.     Denies any urinary symptoms at this time.     PSA  9/30/2024 5.36, PSAd 0.12  Prostate volume: 46cc     Previous 4/3/2024:  Patient was previously seen by me with last visit on 9/29/2023 for elevated PSA.  At that  visit we did repeat his PSA as it had gone higher we discussed that we would repeat it in 6 months.  He is here for follow-up.     Occasionally has difficulty with getting stream started- this comes and goes.      PSA  3/25/2024 4.9, PSAd 0.11  10/2/2023 6.98  4/19/2023 4.82     Previous 9/29/2023:  Patient was previously seen by me with last visit on 6/29/2023 for elevated PSA.  We did treat him with him a month-long course of antibiotics and he is here for follow-up.     He has no urinary symptoms.      He has not had his repeat PSA completed, he states he will complete that next week.     Previous 6/29/2023:  Patient was previously seen by me with an elevated PSA with last visit on 5/9/2023. He did have an MRI of the prostate he is here for follow-up.     MRI of the prostate  6/15/2023 MRI reveals benign prostatic hyperplasia without suspicious lesions per PI-RADS 2 area.  Diffuse ill-defined areas of T2 hypointensity throughout the peripheral zone suggesting sequela of prostatitis.        Previous 5/9/2023:  Patient reports he has had elevated testosterone for several years so they have been checking his PSA as well. He did have an elevation, so was sent here for evaluation     He reports he was exposed to jet fuel in drinking water in 1980.    Frequency- admits- but does drink a lot of coffee   Urgency- admits   Incontinence- denies   Perineal pain- denies   Nocturia- occasional   Stream- normal but more effort   Hesitancy- admits   GH- denies    surgeries- denies   Stones- years ago- passed   Family history of  malignancy- Prostate GF at age 85   Cardiopulmonary- denies   Anticoagulants- denies   Smoker- denies     PSA  4/19/2023 4.82  3/22/2023 5.88  9/12/2022 3.95  9/15/2021 3.53  9/14/2020 2.89  9/12/2019 2.81             Objective     Past Medical History:   Diagnosis Date    ADHD (attention deficit hyperactivity disorder) ?    Late 90s    Allergic rhinitis     Anxiety     Arthritis     Broken bones      Both hands/thumbs     Colon cancer screening     Depression     Deviated nasal septum     Ear pain     Lt ear drum ruptured     Elevated ALT measurement 01/11/2015    Elevated PSA     Erectile dysfunction ?    I'm 62 things work just not as well    Forgetfulness     History of migraine headaches     History of psychiatric care 2005    Hypertriglyceridemia 01/11/2015    Hypertrophy of both inferior nasal turbinates     Kidney calculus     Nasal crusting     Nasal obstruction     Night sweats     Prostate cancer screening     9/12/2019-PSA 2.81       Past Surgical History:   Procedure Laterality Date    EYE SURGERY  1975    No depth perception. Lt eye looks off to the Lt side.     FINGER SURGERY  2017    Trapeziectomy     NASAL SEPTUM SURGERY  12/13/2019    ROTATOR CUFF REPAIR  2005    SEPTOPLASTY, RESECTION INFERIOR TURBINATES  12/13/2019    SINUS SURGERY  2021?    Bridge in nose repositioned scar tissue removed o         Current Outpatient Medications:     multivitamin with minerals tablet tablet, Take 1 tablet by mouth Daily., Disp: , Rfl:     naproxen (Naprosyn) 500 MG tablet, Take 1 tablet by mouth 2 (Two) Times a Day With Meals., Disp: 28 tablet, Rfl: 0    Diclofenac Sodium (VOLTAREN) 1 % gel gel, Apply 4 g topically to the appropriate area as directed 4 (Four) Times a Day As Needed. (Patient not taking: Reported on 8/20/2024), Disp: , Rfl:     Allergies   Allergen Reactions    Chlorothiazide Unknown - High Severity    Penicillins Unknown - High Severity        Family History   Problem Relation Age of Onset    Brain cancer Mother     Cancer Mother         Brain cancer    Mental illness Mother     Stroke Paternal Grandmother     Stroke Paternal Grandfather     Cancer Father         Cancer everywhere    Alcohol abuse Son         Generally addictive       Social History     Socioeconomic History    Marital status:    Tobacco Use    Smoking status: Former     Current packs/day: 0.00     Average packs/day:  "2.5 packs/day for 15.0 years (37.5 ttl pk-yrs)     Types: Cigarettes     Start date: 1971     Quit date: 1986     Years since quittin.9     Passive exposure: Past    Smokeless tobacco: Never    Tobacco comments:     Smoked 10 years    Vaping Use    Vaping status: Never Used   Substance and Sexual Activity    Alcohol use: Not Currently    Drug use: Never    Sexual activity: Yes     Partners: Female     Birth control/protection: None       Vital Signs:   Ht 162.6 cm (64\")   Wt 66.2 kg (146 lb)   BMI 25.06 kg/m²      Physical Exam  Vitals reviewed.   Constitutional:       Appearance: Normal appearance.   Neurological:      General: No focal deficit present.      Mental Status: He is alert and oriented to person, place, and time.   Psychiatric:         Mood and Affect: Mood normal.         Behavior: Behavior normal.          Result Review :   The following data was reviewed by: PARUL Garrett on 2024:     PSA          3/25/2024    11:44 2024    12:08   PSA   PSA 4.900  5.360          Procedures        Assessment and Plan    Diagnoses and all orders for this visit:    1. Elevated prostate specific antigen (PSA) (Primary)  -     PSA DIAGNOSTIC; Future    Given that there are no suspicious lesions and his PSA density remains less than 0.15, he would like to repeat his PSA in 6 months.    He is not having any bothersome urinary symptoms.    I will see him back in 6 months or sooner for new concerns.        Follow Up   No follow-ups on file.  Patient was given instructions and counseling regarding his condition or for health maintenance advice. Please see specific information pulled into the AVS if appropriate.         This document has been electronically signed by PARUL Garrett  2024 09:41 EST       "

## 2024-11-26 ENCOUNTER — OFFICE VISIT (OUTPATIENT)
Dept: UROLOGY | Age: 64
End: 2024-11-26
Payer: MEDICARE

## 2024-11-26 VITALS — BODY MASS INDEX: 24.92 KG/M2 | WEIGHT: 146 LBS | HEIGHT: 64 IN

## 2024-11-26 DIAGNOSIS — R97.20 ELEVATED PROSTATE SPECIFIC ANTIGEN (PSA): Primary | ICD-10-CM

## 2025-02-25 ENCOUNTER — OFFICE VISIT (OUTPATIENT)
Dept: FAMILY MEDICINE CLINIC | Facility: CLINIC | Age: 65
End: 2025-02-25
Payer: MEDICARE

## 2025-02-25 VITALS
OXYGEN SATURATION: 99 % | DIASTOLIC BLOOD PRESSURE: 68 MMHG | WEIGHT: 143.7 LBS | BODY MASS INDEX: 23.94 KG/M2 | HEIGHT: 65 IN | TEMPERATURE: 97.7 F | SYSTOLIC BLOOD PRESSURE: 132 MMHG | HEART RATE: 75 BPM

## 2025-02-25 DIAGNOSIS — H26.9 CATARACT OF BOTH EYES, UNSPECIFIED CATARACT TYPE: ICD-10-CM

## 2025-02-25 DIAGNOSIS — J01.40 ACUTE PANSINUSITIS, RECURRENCE NOT SPECIFIED: Primary | ICD-10-CM

## 2025-02-25 DIAGNOSIS — M18.12 ARTHRITIS OF CARPOMETACARPAL (CMC) JOINT OF LEFT THUMB: ICD-10-CM

## 2025-02-25 DIAGNOSIS — Z12.11 COLON CANCER SCREENING: ICD-10-CM

## 2025-02-25 DIAGNOSIS — R73.09 ABNORMAL GLUCOSE: ICD-10-CM

## 2025-02-25 DIAGNOSIS — G89.29 CHRONIC RIGHT SHOULDER PAIN: ICD-10-CM

## 2025-02-25 DIAGNOSIS — M25.511 CHRONIC RIGHT SHOULDER PAIN: ICD-10-CM

## 2025-02-25 DIAGNOSIS — Z00.00 MEDICARE ANNUAL WELLNESS VISIT, SUBSEQUENT: ICD-10-CM

## 2025-02-25 DIAGNOSIS — R97.20 ELEVATED PROSTATE SPECIFIC ANTIGEN (PSA): ICD-10-CM

## 2025-02-25 DIAGNOSIS — R79.89 LOW SERUM LOW DENSITY LIPOPROTEIN (LDL): ICD-10-CM

## 2025-02-25 LAB
ALBUMIN SERPL-MCNC: 4.6 G/DL (ref 3.5–5.2)
ALBUMIN/GLOB SERPL: 1.9 G/DL
ALP SERPL-CCNC: 73 U/L (ref 39–117)
ALT SERPL W P-5'-P-CCNC: 30 U/L (ref 1–41)
ANION GAP SERPL CALCULATED.3IONS-SCNC: 13 MMOL/L (ref 5–15)
AST SERPL-CCNC: 23 U/L (ref 1–40)
BASOPHILS # BLD AUTO: 0.04 10*3/MM3 (ref 0–0.2)
BASOPHILS NFR BLD AUTO: 0.7 % (ref 0–1.5)
BILIRUB SERPL-MCNC: 0.4 MG/DL (ref 0–1.2)
BUN SERPL-MCNC: 14 MG/DL (ref 8–23)
BUN/CREAT SERPL: 20.3 (ref 7–25)
CALCIUM SPEC-SCNC: 9.8 MG/DL (ref 8.6–10.5)
CHLORIDE SERPL-SCNC: 106 MMOL/L (ref 98–107)
CHOLEST SERPL-MCNC: 158 MG/DL (ref 0–200)
CO2 SERPL-SCNC: 23 MMOL/L (ref 22–29)
CREAT SERPL-MCNC: 0.69 MG/DL (ref 0.76–1.27)
DEPRECATED RDW RBC AUTO: 42.5 FL (ref 37–54)
EGFRCR SERPLBLD CKD-EPI 2021: 102.1 ML/MIN/1.73
EOSINOPHIL # BLD AUTO: 0.06 10*3/MM3 (ref 0–0.4)
EOSINOPHIL NFR BLD AUTO: 1.1 % (ref 0.3–6.2)
ERYTHROCYTE [DISTWIDTH] IN BLOOD BY AUTOMATED COUNT: 12.6 % (ref 12.3–15.4)
GLOBULIN UR ELPH-MCNC: 2.4 GM/DL
GLUCOSE SERPL-MCNC: 82 MG/DL (ref 65–99)
HBA1C MFR BLD: 5.4 % (ref 4.8–5.6)
HCT VFR BLD AUTO: 40.2 % (ref 37.5–51)
HDLC SERPL-MCNC: 53 MG/DL (ref 40–60)
HGB BLD-MCNC: 14.2 G/DL (ref 13–17.7)
IMM GRANULOCYTES # BLD AUTO: 0.01 10*3/MM3 (ref 0–0.05)
IMM GRANULOCYTES NFR BLD AUTO: 0.2 % (ref 0–0.5)
LDLC SERPL CALC-MCNC: 93 MG/DL (ref 0–100)
LDLC/HDLC SERPL: 1.75 {RATIO}
LYMPHOCYTES # BLD AUTO: 1.87 10*3/MM3 (ref 0.7–3.1)
LYMPHOCYTES NFR BLD AUTO: 33.4 % (ref 19.6–45.3)
MCH RBC QN AUTO: 32.6 PG (ref 26.6–33)
MCHC RBC AUTO-ENTMCNC: 35.3 G/DL (ref 31.5–35.7)
MCV RBC AUTO: 92.2 FL (ref 79–97)
MONOCYTES # BLD AUTO: 0.6 10*3/MM3 (ref 0.1–0.9)
MONOCYTES NFR BLD AUTO: 10.7 % (ref 5–12)
NEUTROPHILS NFR BLD AUTO: 3.02 10*3/MM3 (ref 1.7–7)
NEUTROPHILS NFR BLD AUTO: 53.9 % (ref 42.7–76)
NRBC BLD AUTO-RTO: 0 /100 WBC (ref 0–0.2)
PLATELET # BLD AUTO: 284 10*3/MM3 (ref 140–450)
PMV BLD AUTO: 10.1 FL (ref 6–12)
POTASSIUM SERPL-SCNC: 4.5 MMOL/L (ref 3.5–5.2)
PROT SERPL-MCNC: 7 G/DL (ref 6–8.5)
RBC # BLD AUTO: 4.36 10*6/MM3 (ref 4.14–5.8)
SODIUM SERPL-SCNC: 142 MMOL/L (ref 136–145)
TRIGL SERPL-MCNC: 62 MG/DL (ref 0–150)
VLDLC SERPL-MCNC: 12 MG/DL (ref 5–40)
WBC NRBC COR # BLD AUTO: 5.6 10*3/MM3 (ref 3.4–10.8)

## 2025-02-25 PROCEDURE — 99214 OFFICE O/P EST MOD 30 MIN: CPT | Performed by: FAMILY MEDICINE

## 2025-02-25 PROCEDURE — 85025 COMPLETE CBC W/AUTO DIFF WBC: CPT | Performed by: FAMILY MEDICINE

## 2025-02-25 PROCEDURE — 83036 HEMOGLOBIN GLYCOSYLATED A1C: CPT | Performed by: FAMILY MEDICINE

## 2025-02-25 PROCEDURE — 80053 COMPREHEN METABOLIC PANEL: CPT | Performed by: FAMILY MEDICINE

## 2025-02-25 PROCEDURE — 80061 LIPID PANEL: CPT | Performed by: FAMILY MEDICINE

## 2025-02-25 RX ORDER — METHYLPREDNISOLONE 4 MG/1
TABLET ORAL
Qty: 21 EACH | Refills: 0 | Status: SHIPPED | OUTPATIENT
Start: 2025-02-25

## 2025-02-25 RX ORDER — MONTELUKAST SODIUM 10 MG/1
10 TABLET ORAL NIGHTLY
Qty: 90 TABLET | Refills: 3 | Status: SHIPPED | OUTPATIENT
Start: 2025-02-25

## 2025-02-25 RX ORDER — LORATADINE 10 MG/1
10 TABLET ORAL DAILY
Qty: 90 TABLET | Refills: 1 | Status: SHIPPED | OUTPATIENT
Start: 2025-02-25

## 2025-02-25 RX ORDER — AZITHROMYCIN 250 MG/1
TABLET, FILM COATED ORAL
Qty: 6 TABLET | Refills: 0 | Status: SHIPPED | OUTPATIENT
Start: 2025-02-25

## 2025-02-25 NOTE — PROGRESS NOTES
Chief Complaint  Sinus congestion    Subjective          Gray Ball presents to [facility]    Headache  Sinus Problem  Associated symptoms include headaches.        History of Present Illness  The patient is a 64-year-old male who presents today for a 6-month follow-up.    He has been experiencing frontal headaches for the past week, which he attributes to potential mold exposure rather than an infection. He reports a sensation of fluid drainage that alleviates the pressure and subsequently eases the headache. He does not report any green or yellow discharge, and the drainage appears to go down to his stomach. He has not observed any fever. He has Flonase at home. He also mentions a history of nasal surgery a few years ago and a deviated septum due to a nasal fracture sustained in his teenage years.    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 He has had it straightened out twice with surgery, but it drifts back out. He is amblyopic and his depth perception is not normal, which is why he could not get a license to fly. He has cataracts in both eyes, which are slowly growing. His optometrist is not worried and said it will be years before anything needs to be done.    He is due for a colonoscopy. He received a note indicating that it is overdue. He does not mind the procedure but dislikes the laxative. His last colonoscopy was in November 2019 with Dr. Adams, where they found a minor hemorrhoid and said it was just part of the course for his age.    He saw Klarissa Alva on 11/26/2024, and they are keeping track of his elevated PSA and enlarged prostate. He had a PSA completed on 09/30/2024, which was 5.360. He had an MRI of the prostate done in November 2024, which showed no MR evidence of clinically significant prostate malignancy and stable chronic prostatitis. He reports experiencing some symptoms indicative of prostate cancer, despite not having the  "disease.    He received a left thumb CMC joint injection on 11/20/2024, which provided significant relief. He also received an injection in his shoulder, which has been beneficial. He reports that all the knuckles in his hands hurt, but the thumb is not as bad as it was, and the shoulder is good. He can sleep on it.    ALLERGIES  The patient is allergic to PENICILLINS.    MEDICATIONS  Flonase         Current Outpatient Medications   Medication Instructions    azithromycin (Zithromax Z-Israel) 250 MG tablet Take 2 tablets by mouth on day 1, then 1 tablet daily on days 2-5    Diclofenac Sodium (VOLTAREN) 4 g, 4 Times Daily PRN    loratadine (CLARITIN) 10 mg, Oral, Daily    methylPREDNISolone (MEDROL) 4 MG dose pack Take as directed on package instructions.    montelukast (SINGULAIR) 10 mg, Oral, Nightly    multivitamin with minerals tablet tablet 1 tablet, Daily    naproxen (NAPROSYN) 500 mg, Oral, 2 Times Daily With Meals       The following portions of the patient's history were reviewed and updated as appropriate: allergies, current medications, past family history, past medical history, past social history, past surgical history, and problem list.    Objective   Vital Signs:   /68   Pulse 75   Temp 97.7 °F (36.5 °C) (Oral)   Ht 165.1 cm (65\")   Wt 65.2 kg (143 lb 11.2 oz)   SpO2 99%   BMI 23.91 kg/m²     BP Readings from Last 3 Encounters:   02/25/25 132/68   11/20/24 122/60   10/09/24 120/66     Wt Readings from Last 3 Encounters:   02/25/25 65.2 kg (143 lb 11.2 oz)   11/26/24 66.2 kg (146 lb)   11/20/24 66.6 kg (146 lb 14.4 oz)     BMI is within normal parameters. No other follow-up for BMI required.     Physical Exam  Vitals reviewed.   Constitutional:       Appearance: Normal appearance.   HENT:      Head: Normocephalic and atraumatic.      Right Ear: Tympanic membrane, ear canal and external ear normal.      Left Ear: Tympanic membrane, ear canal and external ear normal.      Nose: Congestion " present.      Comments: Tenderness over the left maxillary sinus  Eyes:      Conjunctiva/sclera: Conjunctivae normal.   Cardiovascular:      Rate and Rhythm: Normal rate and regular rhythm.      Heart sounds: No murmur heard.     No friction rub. No gallop.   Pulmonary:      Effort: Pulmonary effort is normal.      Breath sounds: Normal breath sounds. No wheezing or rhonchi.   Abdominal:      General: Bowel sounds are normal. There is no distension.      Palpations: Abdomen is soft.      Tenderness: There is no abdominal tenderness.   Skin:     General: Skin is warm and dry.   Neurological:      Mental Status: He is alert and oriented to person, place, and time.      Cranial Nerves: No cranial nerve deficit.   Psychiatric:         Mood and Affect: Mood and affect normal.         Behavior: Behavior normal.         Thought Content: Thought content normal.         Judgment: Judgment normal.            Result Review :   The following data was reviewed by: Mikey Evans DO on 02/25/2025:  Common labs          3/25/2024    11:44 9/30/2024    12:08   Common Labs   Glucose 86     BUN 10     Creatinine 1.04     Sodium 141     Potassium 4.1     Chloride 107     Calcium 9.7     Albumin 4.7     Total Bilirubin 0.4     Alkaline Phosphatase 83     AST (SGOT) 15     ALT (SGPT) 31     WBC 5.01     Hemoglobin 14.6     Hematocrit 41.6     Platelets 257     Total Cholesterol 144     Triglycerides 83     HDL Cholesterol 50     LDL Cholesterol  78     Hemoglobin A1C 5.60     PSA 4.900  5.360             Lab Results   Component Value Date    SARSANTIGEN Detected (A) 03/30/2023    RAPFLUA Negative 03/30/2023    RAPFLUB Negative 03/30/2023       Results  Laboratory Studies  PSA on 09/30/2024 was 5.360.    Imaging  MRI of the prostate in November 2024 showed no MR evidence of clinically significant prostate malignancy and stable chronic prostatitis.    Procedures        Assessment and Plan    Diagnoses and all orders for this  visit:    1. Acute pansinusitis, recurrence not specified (Primary)    2. Cataract of both eyes, unspecified cataract type    3. Colon cancer screening  Overview:  2019- repeat in October 2024    Orders:  -     Ambulatory Referral For Screening Colonoscopy    4. Elevated prostate specific antigen (PSA)    5. Arthritis of carpometacarpal (CMC) joint of left thumb    6. Chronic right shoulder pain    7. Abnormal glucose    8. Low serum low density lipoprotein (LDL)    Other orders  -     azithromycin (Zithromax Z-Israel) 250 MG tablet; Take 2 tablets by mouth on day 1, then 1 tablet daily on days 2-5  Dispense: 6 tablet; Refill: 0  -     loratadine (Claritin) 10 MG tablet; Take 1 tablet by mouth Daily.  Dispense: 90 tablet; Refill: 1  -     montelukast (Singulair) 10 MG tablet; Take 1 tablet by mouth Every Night.  Dispense: 90 tablet; Refill: 3  -     methylPREDNISolone (MEDROL) 4 MG dose pack; Take as directed on package instructions.  Dispense: 21 each; Refill: 0        Assessment & Plan  1. Sinusitis.  He has been experiencing headaches and pressure relief upon fluid drainage for about a week. There is no green or yellow drainage, and no fever has been noted. He will be started on azithromycin. Additionally, Claritin and Singulair will be prescribed. He is encouraged to use Flonase, which he already has at home. A short course of steroids will also be prescribed. The prescription for the Z-Israel will be sent to [facility].    2. Amblyopia.  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 Depth perception is not normal, affecting his ability to fly. No immediate intervention is required as he manages well with the condition.    3. Bilateral cataracts.  He has cataracts in both eyes, which are slowly growing. His optometrist is not concerned and anticipates that intervention will not be needed for years.    4. Elevated PSA.  His PSA levels have  fluctuated, with the most recent reading being 5.360 on 09/30/2024. An MRI of the prostate in November 2024 showed no clinically significant prostate malignancy but indicated stable chronic prostatitis. He will continue to be monitored, with a repeat PSA test recommended in 6 months.    5. Chronic prostatitis.  The MRI of the prostate showed stable chronic prostatitis, which may be contributing to the elevated PSA levels. Continued monitoring is recommended.    6. Health maintenance.  He is overdue for a colonoscopy, with the last one performed in November 2019. A minor hemorrhoid was found during the last procedure. A referral for a colonoscopy will be made. Labs, including A1c and cholesterol, will be checked today.    7. Left thumb CMC joint pain.  He received a left thumb CMC joint injection on 11/20/2024, which provided significant relief. He is advised to hold off on repeat injections until necessary.    8. Shoulder pain.  He received a shoulder injection on 11/20/2024, which has been beneficial. He can sleep on it. He is advised to hold off on repeat injections until necessary.    Follow-up  The patient will follow up in 6 months.    PROCEDURE  The patient underwent nasal surgery a few years ago.       There are no discontinued medications.       Follow Up   Return in about 6 months (around 8/25/2025) for Medicare Wellness.  Patient was given instructions and counseling regarding his condition or for health maintenance advice. Please see specific information pulled into the AVS if appropriate.     Patient or patient representative verbalized consent for the use of Ambient Listening during the visit with  Mikey Evans DO for chart documentation. 2/25/2025  10:48 EST    Mikey Evans DO  02/25/25  10:58 EST

## 2025-02-26 ENCOUNTER — TELEPHONE (OUTPATIENT)
Dept: GASTROENTEROLOGY | Facility: CLINIC | Age: 65
End: 2025-02-26
Payer: MEDICARE

## 2025-02-26 NOTE — TELEPHONE ENCOUNTER
Called patient about the gastro referral from Dr Evans for a colonoscopy screening. It is also showing that this patient was past due for a colonoscopy screening. Called and was able to get him scheduled for a screening phone call 02/27/2025 at 10:00 am.

## 2025-02-27 ENCOUNTER — PREP FOR SURGERY (OUTPATIENT)
Dept: OTHER | Facility: HOSPITAL | Age: 65
End: 2025-02-27
Payer: MEDICARE

## 2025-02-27 ENCOUNTER — CLINICAL SUPPORT (OUTPATIENT)
Dept: GASTROENTEROLOGY | Facility: CLINIC | Age: 65
End: 2025-02-27
Payer: MEDICARE

## 2025-02-27 DIAGNOSIS — Z86.0100 HISTORY OF COLON POLYPS: Primary | ICD-10-CM

## 2025-02-27 DIAGNOSIS — Z12.11 SCREENING FOR MALIGNANT NEOPLASM OF COLON: ICD-10-CM

## 2025-02-27 RX ORDER — POLYETHYLENE GLYCOL 3350, SODIUM CHLORIDE, SODIUM BICARBONATE, POTASSIUM CHLORIDE 420; 11.2; 5.72; 1.48 G/4L; G/4L; G/4L; G/4L
POWDER, FOR SOLUTION ORAL
Qty: 4000 ML | Refills: 0 | Status: SHIPPED | OUTPATIENT
Start: 2025-02-27

## 2025-02-27 NOTE — PROGRESS NOTES
Gray Ball  1960  64 y.o.    Reason for call: 5 year recall  overdue  Prep prescribed: Nulytley  Prep instructions reviewed with patient and sent to patient via [employer]  Is the patient currently on any injectable or oral medications for weight loss or diabetes? No  Clearance needed? No  If yes, what clearance is needed? N/A  Clearance has been requested from N/A  The patient has been scheduled for: Colonoscopy    If scheduled for screening colonoscopy Gray Ball is aware they have been scheduled for a screening colonoscopy. Patient has expressed they are not having any symptoms at all.     After your procedure, you will be contacted with results. Please confirm the best phone # to reach the patient: [phone]  Family history of colon cancer? No  If yes, indicate relative: N/A  Tentative Procedure Date: 04/22/2025    Date/Place of last Scope: [facility] - 10/22/2019  Able to obtain report? yes    Family History   Problem Relation Age of Onset    Brain cancer Mother     Cancer Mother         Brain cancer    Mental illness Mother     Cancer Father         Cancer everywhere    Stroke Paternal Grandmother     Stroke Paternal Grandfather     Alcohol abuse Son         Generally addictive    Colon cancer Neg Hx      Past Medical History:   Diagnosis Date    ADHD (attention deficit hyperactivity disorder) ?    Late 90s    Allergic rhinitis     Anxiety     Arthritis     Broken bones     Both hands/thumbs     Colon cancer screening     Depression     Deviated nasal septum     Ear pain     Lt ear drum ruptured     Elevated ALT measurement 01/11/2015    Elevated PSA     Erectile dysfunction ?    I'm 62 things work just not as well    Forgetfulness     History of migraine headaches     History of psychiatric care 2005    Hypertriglyceridemia 01/11/2015    Hypertrophy of both inferior nasal turbinates     Kidney calculus     Nasal crusting     Nasal obstruction     Night sweats     Prostate cancer screening      2019-PSA 2.81     Allergies   Allergen Reactions    Chlorothiazide Unknown - High Severity    Penicillins Unknown - High Severity     Past Surgical History:   Procedure Laterality Date    EYE SURGERY      No depth perception. Lt eye looks off to the Lt side.     FINGER SURGERY  2017    Trapeziectomy     NASAL SEPTUM SURGERY  2019    ROTATOR CUFF REPAIR  2005    SEPTOPLASTY, RESECTION INFERIOR TURBINATES  2019    SINUS SURGERY  ?    Bridge in nose repositioned scar tissue removed o     Social History     Socioeconomic History    Marital status:    Tobacco Use    Smoking status: Former     Current packs/day: 0.00     Average packs/day: 2.5 packs/day for 15.0 years (37.5 ttl pk-yrs)     Types: Cigarettes     Start date: 1971     Quit date: 1986     Years since quittin.1     Passive exposure: Past    Smokeless tobacco: Never    Tobacco comments:     Smoked 10 years    Vaping Use    Vaping status: Never Used   Substance and Sexual Activity    Alcohol use: Not Currently    Drug use: Never    Sexual activity: Yes     Partners: Female     Birth control/protection: None       Current Outpatient Medications:     azithromycin (Zithromax Z-Israel) 250 MG tablet, Take 2 tablets by mouth on day 1, then 1 tablet daily on days 2-5, Disp: 6 tablet, Rfl: 0    Diclofenac Sodium (VOLTAREN) 1 % gel gel, Apply 4 g topically to the appropriate area as directed 4 (Four) Times a Day As Needed., Disp: , Rfl:     methylPREDNISolone (MEDROL) 4 MG dose pack, Take as directed on package instructions., Disp: 21 each, Rfl: 0    multivitamin with minerals tablet tablet, Take 1 tablet by mouth Daily., Disp: , Rfl:     loratadine (Claritin) 10 MG tablet, Take 1 tablet by mouth Daily. (Patient not taking: Reported on 2025), Disp: 90 tablet, Rfl: 1    montelukast (Singulair) 10 MG tablet, Take 1 tablet by mouth Every Night. (Patient not taking: Reported on 2025), Disp: 90 tablet, Rfl: 3

## 2025-03-04 ENCOUNTER — PATIENT ROUNDING (BHMG ONLY) (OUTPATIENT)
Dept: FAMILY MEDICINE CLINIC | Facility: CLINIC | Age: 65
End: 2025-03-04
Payer: MEDICARE

## 2025-03-04 NOTE — PROGRESS NOTES
March 4, 2025    Hello, may I speak with Gray Ball?    My name is Krzysztof Mendoza       I am  with [facility]  [address]  [address]  [place] [address]  [phone].    Before we get started may I verify your date of birth? 1960    I am calling to officially welcome you to our practice and ask about your recent visit. Is this a good time to talk? yes    Tell me about your visit with us. What things went well?  it was a great visit there was nothing that could be done different        We're always looking for ways to make our patients' experiences even better. Do you have recommendations on ways we may improve?  no    Overall were you satisfied with your first visit to our practice? yes       I appreciate you taking the time to speak with me today. Is there anything else I can do for you? no      Thank you, and have a great day.

## 2025-04-07 ENCOUNTER — TELEPHONE (OUTPATIENT)
Dept: GASTROENTEROLOGY | Facility: CLINIC | Age: 65
End: 2025-04-07
Payer: MEDICARE

## 2025-04-07 NOTE — TELEPHONE ENCOUNTER
ENDO RECONCILIATION  Verify source of procedure(s): Nurse/MA screening  If other, please list source:     TIME OUT-CONFIRM CORRECT PROCEDURE: Colonoscopy  Cardiology:  Pulmonology:  Blood thinner:   GLP-1:  Additional DX/indication for procedure:   Please include any other notes relevant to endo reconciliation: N/A

## 2025-04-21 ENCOUNTER — ANESTHESIA EVENT (OUTPATIENT)
Dept: GASTROENTEROLOGY | Facility: HOSPITAL | Age: 65
End: 2025-04-21
Payer: MEDICARE

## 2025-04-21 NOTE — ANESTHESIA PREPROCEDURE EVALUATION
Anesthesia Evaluation     Patient summary reviewed and Nursing notes reviewed   NPO Solid Status: > 8 hours  NPO Liquid Status: > 4 hours           Airway   Mallampati: I  TM distance: >3 FB  Neck ROM: full  No difficulty expected  Comment: Hx deviated septum  Dental - normal exam     Pulmonary - normal exam    breath sounds clear to auscultation  (+) a smoker (current) Former,  Cardiovascular - normal exam  Exercise tolerance: good (4-7 METS)    Rhythm: regular  Rate: normal    (+) hyperlipidemia      Neuro/Psych  (+) psychiatric history Anxiety, Depression and ADHD  GI/Hepatic/Renal/Endo    (+) renal disease- stones    Musculoskeletal     Abdominal    Substance History      OB/GYN          Other   arthritis,     ROS/Med Hx Other: Screening, hx polyps     No EKG on file                 Anesthesia Plan    ASA 2     general   total IV anesthesia  (Total IV Anesthesia    Patient understands anesthesia not responsible for dental damage.      Discussed risks with pt including aspiration, allergic reactions, apnea, advanced airway placement. Pt verbalized understanding. All questions answered.     )  intravenous induction     Anesthetic plan, risks, benefits, and alternatives have been provided, discussed and informed consent has been obtained with: patient.  Pre-procedure education provided  Plan discussed with CRNA.      CODE STATUS:

## 2025-04-22 ENCOUNTER — ANESTHESIA (OUTPATIENT)
Dept: GASTROENTEROLOGY | Facility: HOSPITAL | Age: 65
End: 2025-04-22
Payer: MEDICARE

## 2025-04-22 ENCOUNTER — HOSPITAL ENCOUNTER (OUTPATIENT)
Facility: HOSPITAL | Age: 65
Setting detail: HOSPITAL OUTPATIENT SURGERY
Discharge: HOME | End: 2025-04-22
Attending: INTERNAL MEDICINE | Admitting: INTERNAL MEDICINE
Payer: MEDICARE

## 2025-04-22 VITALS
HEIGHT: 63 IN | HEART RATE: 65 BPM | TEMPERATURE: 97 F | SYSTOLIC BLOOD PRESSURE: 117 MMHG | OXYGEN SATURATION: 100 % | RESPIRATION RATE: 18 BRPM | DIASTOLIC BLOOD PRESSURE: 75 MMHG | BODY MASS INDEX: 24.96 KG/M2 | WEIGHT: 140.87 LBS

## 2025-04-22 DIAGNOSIS — Z12.11 SCREENING FOR MALIGNANT NEOPLASM OF COLON: ICD-10-CM

## 2025-04-22 DIAGNOSIS — Z86.0100 HISTORY OF COLON POLYPS: ICD-10-CM

## 2025-04-22 PROCEDURE — 25810000003 LACTATED RINGERS PER 1000 ML: Performed by: NURSE ANESTHETIST, CERTIFIED REGISTERED

## 2025-04-22 PROCEDURE — 25010000002 PROPOFOL 10 MG/ML EMULSION: Performed by: NURSE ANESTHETIST, CERTIFIED REGISTERED

## 2025-04-22 PROCEDURE — 88305 TISSUE EXAM BY PATHOLOGIST: CPT | Performed by: INTERNAL MEDICINE

## 2025-04-22 PROCEDURE — 25810000003 LACTATED RINGERS PER 1000 ML

## 2025-04-22 PROCEDURE — 25010000002 LIDOCAINE PF 2% 2 % SOLUTION: Performed by: NURSE ANESTHETIST, CERTIFIED REGISTERED

## 2025-04-22 RX ORDER — PROPOFOL 10 MG/ML
VIAL (ML) INTRAVENOUS AS NEEDED
Status: DISCONTINUED | OUTPATIENT
Start: 2025-04-22 | End: 2025-04-22 | Stop reason: SURG

## 2025-04-22 RX ORDER — SODIUM CHLORIDE, SODIUM LACTATE, POTASSIUM CHLORIDE, CALCIUM CHLORIDE 600; 310; 30; 20 MG/100ML; MG/100ML; MG/100ML; MG/100ML
INJECTION, SOLUTION INTRAVENOUS CONTINUOUS PRN
Status: DISCONTINUED | OUTPATIENT
Start: 2025-04-22 | End: 2025-04-22 | Stop reason: SURG

## 2025-04-22 RX ORDER — SODIUM CHLORIDE, SODIUM LACTATE, POTASSIUM CHLORIDE, CALCIUM CHLORIDE 600; 310; 30; 20 MG/100ML; MG/100ML; MG/100ML; MG/100ML
30 INJECTION, SOLUTION INTRAVENOUS CONTINUOUS
Status: DISCONTINUED | OUTPATIENT
Start: 2025-04-22 | End: 2025-04-22 | Stop reason: HOSPADM

## 2025-04-22 RX ORDER — LIDOCAINE HYDROCHLORIDE 20 MG/ML
INJECTION, SOLUTION EPIDURAL; INFILTRATION; INTRACAUDAL; PERINEURAL AS NEEDED
Status: DISCONTINUED | OUTPATIENT
Start: 2025-04-22 | End: 2025-04-22 | Stop reason: SURG

## 2025-04-22 RX ADMIN — SODIUM CHLORIDE, POTASSIUM CHLORIDE, SODIUM LACTATE AND CALCIUM CHLORIDE: 600; 310; 30; 20 INJECTION, SOLUTION INTRAVENOUS at 08:14

## 2025-04-22 RX ADMIN — PROPOFOL 60 MG: 10 INJECTION, EMULSION INTRAVENOUS at 08:16

## 2025-04-22 RX ADMIN — PROPOFOL 175 MCG/KG/MIN: 10 INJECTION, EMULSION INTRAVENOUS at 08:17

## 2025-04-22 RX ADMIN — LIDOCAINE HYDROCHLORIDE 60 MG: 20 INJECTION, SOLUTION EPIDURAL; INFILTRATION; INTRACAUDAL; PERINEURAL at 08:16

## 2025-04-22 RX ADMIN — SODIUM CHLORIDE, POTASSIUM CHLORIDE, SODIUM LACTATE AND CALCIUM CHLORIDE 30 ML/HR: 600; 310; 30; 20 INJECTION, SOLUTION INTRAVENOUS at 08:00

## 2025-04-22 NOTE — ANESTHESIA POSTPROCEDURE EVALUATION
Patient: Gray Ball    Procedure Summary       Date: 04/22/25 Room / Location: [facility] / [facility]    Anesthesia Start: 0815 Anesthesia Stop: 0852    Procedure: COLONOSCOPY WITH COLD SNARE POLYPECTOMY Diagnosis:       History of colon polyps      Screening for malignant neoplasm of colon      (History of colon polyps [Z86.0100])      (Screening for malignant neoplasm of colon [Z12.11])    Surgeons: Emma Adams MD Provider: Que Joseph CRNA    Anesthesia Type: general ASA Status: 2            Anesthesia Type: general    Vitals  Vitals Value Taken Time   /75 04/22/25 09:02   Temp 36.5 °C (97.7 °F) 04/22/25 08:46   Pulse 70 04/22/25 09:06   Resp 13 04/22/25 08:56   SpO2 100 % 04/22/25 09:06   Vitals shown include unfiled device data.        Post Anesthesia Care and Evaluation    Patient location during evaluation: bedside  Patient participation: complete - patient participated  Level of consciousness: awake  Pain management: adequate    Airway patency: patent  Anesthetic complications: No anesthetic complications  PONV Status: controlled  Cardiovascular status: acceptable and stable  Respiratory status: acceptable

## 2025-04-22 NOTE — H&P
Pre Procedure History & Physical    Chief Complaint:   Surveillance colonoscopy    Subjective     HPI:   63 yo M here for surveillance colonoscopy.    Past Medical History:   Past Medical History:   Diagnosis Date    ADHD (attention deficit hyperactivity disorder) ?    Late 90s    Allergic rhinitis     Anxiety     Arthritis     Broken bones     Both hands/thumbs     Colon cancer screening     Depression     Deviated nasal septum     Ear pain     Lt ear drum ruptured     Elevated ALT measurement 01/11/2015    Elevated PSA     Erectile dysfunction ?    I'm 62 things work just not as well    Forgetfulness     History of migraine headaches     History of psychiatric care 2005    Hypertriglyceridemia 01/11/2015    Hypertrophy of both inferior nasal turbinates     Kidney calculus     Nasal crusting     Nasal obstruction     Night sweats     Prostate cancer screening     9/12/2019-PSA 2.81       Past Surgical History:  Past Surgical History:   Procedure Laterality Date    EYE SURGERY  1975    No depth perception. Lt eye looks off to the Lt side.     FINGER SURGERY  2017    Trapeziectomy     NASAL SEPTUM SURGERY  12/13/2019    ROTATOR CUFF REPAIR  2005    SEPTOPLASTY, RESECTION INFERIOR TURBINATES  12/13/2019    SINUS SURGERY  2021?    Bridge in nose repositioned scar tissue removed o       Family History:  Family History   Problem Relation Age of Onset    Brain cancer Mother     Cancer Mother         Brain cancer    Mental illness Mother     Cancer Father         Cancer everywhere    Stroke Paternal Grandmother     Stroke Paternal Grandfather     Alcohol abuse Son         Generally addictive    Colon cancer Neg Hx        Social History:   reports that he quit smoking about 39 years ago. His smoking use included cigarettes. He started smoking about 54 years ago. He has a 37.5 pack-year smoking history. He has been exposed to tobacco smoke. He has never used smokeless tobacco. He reports that he does not currently use  "alcohol. He reports that he does not use drugs.    Medications:   Medications Prior to Admission   Medication Sig Dispense Refill Last Dose/Taking    azelastine (ASTELIN) 0.1 % nasal spray Administer 2 sprays into the nostril(s) as directed by provider 2 (Two) Times a Day. Use in each nostril as directed 30 mL 0     Diclofenac Sodium (VOLTAREN) 1 % gel gel Apply 4 g topically to the appropriate area as directed 4 (Four) Times a Day As Needed.       loratadine (Claritin) 10 MG tablet Take 1 tablet by mouth Daily. 90 tablet 1     methylPREDNISolone (MEDROL) 4 MG dose pack Take as directed on package instructions. 21 tablet 0     montelukast (Singulair) 10 MG tablet Take 1 tablet by mouth Every Night. 90 tablet 3     multivitamin with minerals tablet tablet Take 1 tablet by mouth Daily.       polyethylene glycol-electrolytes (NULYTELY) 420 g solution Take as directed by your Gastroenterologist. 4000 mL 0        Allergies:  Chlorothiazide and Penicillins    ROS:    Pertinent items are noted in HPI     Objective     Height 160 cm (63\"), weight 63.9 kg (140 lb 14 oz).    Physical Exam   Constitutional: Pt is oriented to person, place, and time and well-developed, well-nourished, and in no distress.   Mouth/Throat: Oropharynx is clear and moist.   Neck: Normal range of motion.   Cardiovascular: Normal rate, regular rhythm and normal heart sounds.    Pulmonary/Chest: Effort normal and breath sounds normal.   Abdominal: Soft. Nontender  Skin: Skin is warm and dry.   Psychiatric: Mood, memory, affect and judgment normal.     Assessment & Plan     Diagnosis:  Surveillance colonoscopy    Anticipated Surgical Procedure:  Colonoscopy    The risks, benefits, and alternatives of this procedure have been discussed with the patient or the responsible party- the patient understands and agrees to proceed.           "

## 2025-04-22 NOTE — NURSING NOTE
ABDOMINAL PRESSURE APPLIED PER MD INSTRUCTION TO ASSIST WITH ADVANCEMENT OF SCOPE.  Jennie Bay RN

## 2025-04-23 ENCOUNTER — RESULTS FOLLOW-UP (OUTPATIENT)
Dept: GASTROENTEROLOGY | Facility: HOSPITAL | Age: 65
End: 2025-04-23
Payer: MEDICARE

## 2025-04-23 LAB
CYTO UR: NORMAL
LAB AP CASE REPORT: NORMAL
LAB AP CLINICAL INFORMATION: NORMAL
PATH REPORT.FINAL DX SPEC: NORMAL
PATH REPORT.GROSS SPEC: NORMAL

## 2025-04-23 NOTE — LETTER
April 23, 2025    Gray Ball  [address] [place] [address]    4/23/2025         Gray Ball   [address] [id number]            Dear Gray Adams MD performed a(n) Colonoscopy on April 22, 2025; Your biopsy results were benign.  If applicable, please refer to the pathology and/or procedure report for more detailed information via MyChart or by obtaining a copy of the reports from our office. We recommend that you have a repeat Colonoscopy in 3 years.    A colonoscopy is the best way to screen for colon polyps and colon cancer, however despite careful evaluation, small polyps can sometimes be missed. If you should develop any bleeding, abdominal pain, weight loss, change in bowel habits, or any other GI complications, please inform our office as soon as possible.      Additionally, Emma Adams MD recommends you adopt the following healthy habits to lower your risk of future polyps and colon cancer:    Exercise regularly.  Do not smoke or consume alcohol.  Limit red meat intake.  Include ample fruits and vegetables in your diet.    If you have any questions regarding your procedure or results, please do not hesitate to contact our office.    Sincerely,        Gastroenterology [facility]          PARUL Rowe

## 2025-05-23 NOTE — PROGRESS NOTES
Chief Complaint: Elevated PSA    Subjective         History of Present Illness  Gray Ball is a 64 y.o. male presents to [facility] to be seen for follow-up.    Patient was previously seen by me with last visit on 11/26/2024 for elevated PSA.  At that visit his MRI did not show any suspicious lesions and his PSA density was less than 0.15.  He is here for follow-up.    History of Present Illness  The patient is a 64-year-old male here for follow-up of elevated PSA.    He reports no urinary symptoms and has not undergone a recent PSA test since his last visit.     He is not currently receiving any testosterone treatment, although he had an elevated level in the past. He does not use any over-the-counter testosterone supplements.      MEDICATIONS  Current: Multivitamin, Claritin      Previous 11/26/2024:  Patient was previously seen by me with last visit on 10/3/2024 for elevated PSA.  We did schedule him for an MRI of the prostate and he is here to go over those results.     Denies any bothersome urinary symptoms.      PSAd 0.09        EXAMINATION: MRI PROSTATE WO AND W CONTRAST     ACCESSION NUMBER: [id number]     DATE: 11/12/2024 10:22 AM     HISTORY: Elevated PSA PSA level 5.36     COMPARISON: MRI prostate 6/15/2023     FINDINGS:     Prostate size: 4.4 cm x 5.4 cm x 4.8 cm for a total volume of 59 mL.   PSA density 0.09     Peripheral zone: Stable regions of ill-defined low T2 signal in the   peripheral zone compatible with chronic prostatitis. No new   abnormality.     Transition zone: There are changes related to benign prostatic   hypertrophy. The gland protrudes superiorly into the bladder base.     Extracapsular extension: There is no evidence of extracapsular   extension.     The seminal vesicles and neurovascular bundles are normal. No pelvic   lymphadenopathy. Bladder wall thickening compatible with outlet   obstruction.     IMPRESSION:     1. No MR evidence for  clinically significant prostate   malignancy.Stable chronic prostatitis in the peripheral zone with   benign prostatic hyperplasia.     Dictated by: Bell Billy M.D.      Previous 10/3/2024:  Patient was previously seen by me with last visit on 4/3/2024 for elevated PSA.  At that visit he wanted to repeat his PSA in 6 months to see if it had gone any higher.  We also discussed the possibility of repeating his MRI.  He is here for follow-up.     Denies any urinary symptoms at this time.     PSA  9/30/2024 5.36, PSAd 0.12  Prostate volume: 46cc     Previous 4/3/2024:  Patient was previously seen by me with last visit on 9/29/2023 for elevated PSA.  At that visit we did repeat his PSA as it had gone higher we discussed that we would repeat it in 6 months.  He is here for follow-up.     Occasionally has difficulty with getting stream started- this comes and goes.      PSA  3/25/2024 4.9, PSAd 0.11  10/2/2023 6.98  4/19/2023 4.82     Previous 9/29/2023:  Patient was previously seen by me with last visit on 6/29/2023 for elevated PSA.  We did treat him with him a month-long course of antibiotics and he is here for follow-up.     He has no urinary symptoms.      He has not had his repeat PSA completed, he states he will complete that next week.     Previous 6/29/2023:  Patient was previously seen by me with an elevated PSA with last visit on 5/9/2023. He did have an MRI of the prostate he is here for follow-up.     MRI of the prostate  6/15/2023 MRI reveals benign prostatic hyperplasia without suspicious lesions per PI-RADS 2 area.  Diffuse ill-defined areas of T2 hypointensity throughout the peripheral zone suggesting sequela of prostatitis.        Previous 5/9/2023:  Patient reports he has had elevated testosterone for several years so they have been checking his PSA as well. He did have an elevation, so was sent here for evaluation     He reports he was exposed to jet fuel in drinking water in 1980.    Frequency-  admits- but does drink a lot of coffee   Urgency- admits   Incontinence- denies   Perineal pain- denies   Nocturia- occasional   Stream- normal but more effort   Hesitancy- admits   GH- denies    surgeries- denies   Stones- years ago- passed   Family history of  malignancy- Prostate GF at age 85   Cardiopulmonary- denies   Anticoagulants- denies   Smoker- denies     PSA  4/19/2023 4.82  3/22/2023 5.88  9/12/2022 3.95  9/15/2021 3.53  9/14/2020 2.89  9/12/2019 2.81          Objective     Past Medical History:   Diagnosis Date    ADHD (attention deficit hyperactivity disorder) ?    Late 90s    Allergic rhinitis     Anxiety     Arthritis     Broken bones     Both hands/thumbs     Colon cancer screening     Depression     Deviated nasal septum     Ear pain     Lt ear drum ruptured     Elevated ALT measurement 01/11/2015    Elevated PSA     Erectile dysfunction ?    I'm 62 things work just not as well    Forgetfulness     History of migraine headaches     History of psychiatric care 2005    Hypertriglyceridemia 01/11/2015    Hypertrophy of both inferior nasal turbinates     Kidney calculus     Nasal crusting     Nasal obstruction     Night sweats     Prostate cancer screening     9/12/2019-PSA 2.81       Past Surgical History:   Procedure Laterality Date    COLONOSCOPY N/A 4/22/2025    Procedure: COLONOSCOPY WITH COLD SNARE POLYPECTOMY;  Surgeon: Emma Adams MD;  Location: [facility] END[facility]COPY;  Service: Gastroenterology;  Laterality: N/A;  COLON POLYPS    EYE SURGERY  1975    No depth perception. Lt eye looks off to the Lt side.     FINGER SURGERY  2017    Trapeziectomy     NASAL SEPTUM SURGERY  12/13/2019    ROTATOR CUFF REPAIR  2005    SEPTOPLASTY, RESECTION INFERIOR TURBINATES  12/13/2019    SINUS SURGERY  2021?    Bridge in nose repositioned scar tissue removed o         Current Outpatient Medications:     azelastine (ASTELIN) 0.1 % nasal spray, Administer 2 sprays into the nostril(s) as directed by  "provider 2 (Two) Times a Day. Use in each nostril as directed, Disp: 30 mL, Rfl: 0    Diclofenac Sodium (VOLTAREN) 1 % gel gel, Apply 4 g topically to the appropriate area as directed 4 (Four) Times a Day As Needed., Disp: , Rfl:     loratadine (Claritin) 10 MG tablet, Take 1 tablet by mouth Daily., Disp: 90 tablet, Rfl: 1    montelukast (Singulair) 10 MG tablet, Take 1 tablet by mouth Every Night., Disp: 90 tablet, Rfl: 3    multivitamin with minerals tablet tablet, Take 1 tablet by mouth Daily., Disp: , Rfl:     Allergies   Allergen Reactions    Chlorothiazide Unknown - High Severity    Penicillins Unknown - High Severity        Family History   Problem Relation Age of Onset    Brain cancer Mother     Cancer Mother         Brain cancer    Mental illness Mother     Cancer Father         Cancer everywhere    Stroke Paternal Grandmother     Stroke Paternal Grandfather     Alcohol abuse Son         Generally addictive    Colon cancer Neg Hx        Social History     Socioeconomic History    Marital status:    Tobacco Use    Smoking status: Former     Current packs/day: 0.00     Average packs/day: 2.5 packs/day for 15.0 years (37.5 ttl pk-yrs)     Types: Cigarettes     Start date: 1971     Quit date: 1986     Years since quittin.4     Passive exposure: Past    Smokeless tobacco: Never    Tobacco comments:     Smoked 10 years    Vaping Use    Vaping status: Never Used   Substance and Sexual Activity    Alcohol use: Not Currently    Drug use: Never    Sexual activity: Yes     Partners: Female     Birth control/protection: None       Vital Signs:   Resp 14   Ht 160 cm (63\")   Wt 63.9 kg (140 lb 14 oz)   BMI 24.95 kg/m²      Physical Exam  Vitals reviewed.   Constitutional:       Appearance: Normal appearance.   Neurological:      General: No focal deficit present.      Mental Status: He is alert and oriented to person, place, and time.   Psychiatric:         Mood and Affect: Mood normal.         " Behavior: Behavior normal.          Result Review :   The following data was reviewed by: PARUL Garrett on 05/27/2025:  Results for orders placed or performed in visit on 05/27/25   Bladder Scan    Collection Time: 05/27/25  9:06 AM   Result Value Ref Range    Urine Volume 11       PSA          9/30/2024    12:08   PSA   PSA 5.360      Bladder Scan interpretation 05/27/2025    Estimation of residual urine via BVI 3000 Verathon Bladder Scan  MA/nurse performing: Radha JORGE MA  Residual Urine: 11 ml  Indication: Elevated prostate specific antigen (PSA)    Elevated testosterone level in male   Position: Supine  Examination: Incremental scanning of the suprapubic area using 2.0 MHz transducer using copious amounts of acoustic gel.   Findings: An anechoic area was demonstrated which represented the bladder, with measurement of residual urine as noted. I inspected this myself. In that the residual urine was stable or insignificant, refer to plan for treatment and plan necessary at this time.       Results  Laboratory Studies  Testosterone level was elevated at one point but had come back down as per the last check in 2023.      Procedures        Assessment and Plan    Diagnoses and all orders for this visit:    1. Elevated prostate specific antigen (PSA) (Primary)  -     Bladder Scan    2. Elevated testosterone level in male  -     Testosterone; Future        Assessment & Plan  1. Elevated PSA.  He has not had his PSA checked since the last visit. A PSA test will be conducted today. If the results indicate stability, the frequency of testing may be reduced to an annual basis. However, if the levels remain elevated, further discussion regarding the appropriate course of action will be necessary.    2. Elevated testosterone.  His testosterone levels were elevated at one point but had normalized by the last check in 2023. A testosterone test will be conducted today before 11 AM to ensure accurate  results.    Follow-up  The patient is scheduled for a follow-up visit in 6 months.      Follow Up   Return in about 6 months (around 11/27/2025).  Patient was given instructions and counseling regarding his condition or for health maintenance advice. Please see specific information pulled into the AVS if appropriate.         This document has been electronically signed by PARUL Garrett  May 27, 2025 09:21 EDT     Patient or patient representative verbalized consent for the use of Ambient Listening during the visit with  PARUL Garrett for chart documentation. 5/27/2025  09:21 EDT

## 2025-05-27 ENCOUNTER — LAB (OUTPATIENT)
Facility: HOSPITAL | Age: 65
End: 2025-05-27
Payer: MEDICARE

## 2025-05-27 ENCOUNTER — OFFICE VISIT (OUTPATIENT)
Dept: UROLOGY | Age: 65
End: 2025-05-27
Payer: MEDICARE

## 2025-05-27 VITALS — BODY MASS INDEX: 24.96 KG/M2 | HEIGHT: 63 IN | WEIGHT: 140.87 LBS | RESPIRATION RATE: 14 BRPM

## 2025-05-27 DIAGNOSIS — R79.89 ELEVATED TESTOSTERONE LEVEL IN MALE: ICD-10-CM

## 2025-05-27 DIAGNOSIS — R97.20 ELEVATED PROSTATE SPECIFIC ANTIGEN (PSA): Primary | ICD-10-CM

## 2025-05-27 DIAGNOSIS — R97.20 ELEVATED PROSTATE SPECIFIC ANTIGEN (PSA): ICD-10-CM

## 2025-05-27 LAB
PSA SERPL-MCNC: 5.37 NG/ML (ref 0–4)
TESTOST SERPL-MCNC: 1170 NG/DL (ref 193–740)
URINE VOLUME: 11

## 2025-05-27 PROCEDURE — 36415 COLL VENOUS BLD VENIPUNCTURE: CPT

## 2025-05-27 PROCEDURE — 84403 ASSAY OF TOTAL TESTOSTERONE: CPT

## 2025-05-27 PROCEDURE — 84153 ASSAY OF PSA TOTAL: CPT

## 2025-05-28 DIAGNOSIS — R79.89 ELEVATED TESTOSTERONE LEVEL IN MALE: Primary | ICD-10-CM

## 2025-08-21 ENCOUNTER — OFFICE VISIT (OUTPATIENT)
Dept: FAMILY MEDICINE CLINIC | Facility: CLINIC | Age: 65
End: 2025-08-21
Payer: MEDICARE

## 2025-08-21 VITALS
BODY MASS INDEX: 24.96 KG/M2 | SYSTOLIC BLOOD PRESSURE: 118 MMHG | WEIGHT: 140.9 LBS | DIASTOLIC BLOOD PRESSURE: 68 MMHG | TEMPERATURE: 97.9 F | OXYGEN SATURATION: 100 % | HEIGHT: 63 IN | HEART RATE: 69 BPM

## 2025-08-21 DIAGNOSIS — J30.9 ALLERGIC RHINITIS, UNSPECIFIED SEASONALITY, UNSPECIFIED TRIGGER: ICD-10-CM

## 2025-08-21 DIAGNOSIS — K63.5 POLYP OF COLON, UNSPECIFIED PART OF COLON, UNSPECIFIED TYPE: ICD-10-CM

## 2025-08-21 DIAGNOSIS — R79.89 ELEVATED TESTOSTERONE LEVEL: ICD-10-CM

## 2025-08-21 DIAGNOSIS — Z00.00 MEDICARE ANNUAL WELLNESS VISIT, SUBSEQUENT: Primary | ICD-10-CM

## 2025-08-21 DIAGNOSIS — H53.002 AMBLYOPIA OF LEFT EYE: ICD-10-CM

## 2025-08-21 DIAGNOSIS — R97.20 ELEVATED PSA: ICD-10-CM

## 2025-08-21 DIAGNOSIS — G89.29 CHRONIC RIGHT SHOULDER PAIN: ICD-10-CM

## 2025-08-21 DIAGNOSIS — R53.83 OTHER FATIGUE: ICD-10-CM

## 2025-08-21 DIAGNOSIS — E55.9 VITAMIN D DEFICIENCY: ICD-10-CM

## 2025-08-21 DIAGNOSIS — M25.511 CHRONIC RIGHT SHOULDER PAIN: ICD-10-CM

## 2025-08-21 DIAGNOSIS — M18.12 ARTHRITIS OF CARPOMETACARPAL (CMC) JOINT OF LEFT THUMB: ICD-10-CM

## 2025-08-21 LAB
25(OH)D3 SERPL-MCNC: 44.8 NG/ML (ref 30–100)
ALBUMIN SERPL-MCNC: 4.7 G/DL (ref 3.5–5.2)
ALBUMIN/GLOB SERPL: 1.4 G/DL
ALP SERPL-CCNC: 80 U/L (ref 39–117)
ALT SERPL W P-5'-P-CCNC: 40 U/L (ref 1–41)
ANION GAP SERPL CALCULATED.3IONS-SCNC: 11.1 MMOL/L (ref 5–15)
AST SERPL-CCNC: 26 U/L (ref 1–40)
BASOPHILS # BLD AUTO: 0.02 10*3/MM3 (ref 0–0.2)
BASOPHILS NFR BLD AUTO: 0.3 % (ref 0–1.5)
BILIRUB SERPL-MCNC: 0.5 MG/DL (ref 0–1.2)
BUN SERPL-MCNC: 13 MG/DL (ref 8–23)
BUN/CREAT SERPL: 11.7 (ref 7–25)
CALCIUM SPEC-SCNC: 10.3 MG/DL (ref 8.6–10.5)
CHLORIDE SERPL-SCNC: 106 MMOL/L (ref 98–107)
CO2 SERPL-SCNC: 22.9 MMOL/L (ref 22–29)
CREAT SERPL-MCNC: 1.11 MG/DL (ref 0.76–1.27)
DEPRECATED RDW RBC AUTO: 41.7 FL (ref 37–54)
EGFRCR SERPLBLD CKD-EPI 2021: 73.2 ML/MIN/1.73
EOSINOPHIL # BLD AUTO: 0.08 10*3/MM3 (ref 0–0.4)
EOSINOPHIL NFR BLD AUTO: 1.3 % (ref 0.3–6.2)
ERYTHROCYTE [DISTWIDTH] IN BLOOD BY AUTOMATED COUNT: 12.7 % (ref 12.3–15.4)
FOLATE SERPL-MCNC: 19.3 NG/ML (ref 4.78–24.2)
GLOBULIN UR ELPH-MCNC: 3.4 GM/DL
GLUCOSE SERPL-MCNC: 96 MG/DL (ref 65–99)
HCT VFR BLD AUTO: 41.9 % (ref 37.5–51)
HGB BLD-MCNC: 14.3 G/DL (ref 13–17.7)
IMM GRANULOCYTES # BLD AUTO: 0.01 10*3/MM3 (ref 0–0.05)
IMM GRANULOCYTES NFR BLD AUTO: 0.2 % (ref 0–0.5)
IRON 24H UR-MRATE: 142 MCG/DL (ref 59–158)
IRON SATN MFR SERPL: 34 % (ref 20–50)
LYMPHOCYTES # BLD AUTO: 2.12 10*3/MM3 (ref 0.7–3.1)
LYMPHOCYTES NFR BLD AUTO: 34.1 % (ref 19.6–45.3)
MCH RBC QN AUTO: 31 PG (ref 26.6–33)
MCHC RBC AUTO-ENTMCNC: 34.1 G/DL (ref 31.5–35.7)
MCV RBC AUTO: 90.7 FL (ref 79–97)
MONOCYTES # BLD AUTO: 0.57 10*3/MM3 (ref 0.1–0.9)
MONOCYTES NFR BLD AUTO: 9.2 % (ref 5–12)
NEUTROPHILS NFR BLD AUTO: 3.41 10*3/MM3 (ref 1.7–7)
NEUTROPHILS NFR BLD AUTO: 54.9 % (ref 42.7–76)
NRBC BLD AUTO-RTO: 0 /100 WBC (ref 0–0.2)
PLATELET # BLD AUTO: 275 10*3/MM3 (ref 140–450)
PMV BLD AUTO: 10.3 FL (ref 6–12)
POTASSIUM SERPL-SCNC: 4.9 MMOL/L (ref 3.5–5.2)
PROT SERPL-MCNC: 8.1 G/DL (ref 6–8.5)
RBC # BLD AUTO: 4.62 10*6/MM3 (ref 4.14–5.8)
SODIUM SERPL-SCNC: 140 MMOL/L (ref 136–145)
T4 FREE SERPL-MCNC: 1.32 NG/DL (ref 0.92–1.68)
TIBC SERPL-MCNC: 413 MCG/DL (ref 298–536)
TRANSFERRIN SERPL-MCNC: 277 MG/DL (ref 200–360)
TSH SERPL DL<=0.05 MIU/L-ACNC: 2.08 UIU/ML (ref 0.27–4.2)
VIT B12 BLD-MCNC: 418 PG/ML (ref 211–946)
WBC NRBC COR # BLD AUTO: 6.21 10*3/MM3 (ref 3.4–10.8)

## 2025-08-21 RX ORDER — LIDOCAINE HYDROCHLORIDE 10 MG/ML
0.5 INJECTION, SOLUTION INFILTRATION; PERINEURAL
Status: COMPLETED | OUTPATIENT
Start: 2025-08-21 | End: 2025-08-21

## 2025-08-21 RX ORDER — METHYLPREDNISOLONE ACETATE 40 MG/ML
40 INJECTION, SUSPENSION INTRA-ARTICULAR; INTRALESIONAL; INTRAMUSCULAR; SOFT TISSUE
Status: COMPLETED | OUTPATIENT
Start: 2025-08-21 | End: 2025-08-21

## 2025-08-21 RX ADMIN — LIDOCAINE HYDROCHLORIDE 0.5 ML: 10 INJECTION, SOLUTION INFILTRATION; PERINEURAL at 12:24

## 2025-08-21 RX ADMIN — METHYLPREDNISOLONE ACETATE 40 MG: 40 INJECTION, SUSPENSION INTRA-ARTICULAR; INTRALESIONAL; INTRAMUSCULAR; SOFT TISSUE at 12:24

## (undated) DEVICE — SNAR E/S POLYP SNAREMASTER OVL/10MM 2.8X2300MM YEL

## (undated) DEVICE — SOL IRRG H2O PL/BG 1000ML STRL

## (undated) DEVICE — Device

## (undated) DEVICE — SOLIDIFIER LIQLOC PLS 1500CC BT

## (undated) DEVICE — LINER SURG CANSTR SXN S/RIGD 1500CC

## (undated) DEVICE — DEFENDO AIR WATER SUCTION AND BIOPSY VALVE KIT FOR  OLYMPUS: Brand: DEFENDO AIR/WATER/SUCTION AND BIOPSY VALVE

## (undated) DEVICE — THE SINGLE USE ETRAP – POLYP TRAP IS USED FOR SUCTION RETRIEVAL OF ENDOSCOPICALLY REMOVED POLYPS.: Brand: ETRAP

## (undated) DEVICE — CONN JET HYDRA H20 AUXILIARY DISP

## (undated) DEVICE — THE STERILE LIGHT HANDLE COVER IS USED WITH STERIS SURGICAL LIGHTING AND VISUALIZATION SYSTEMS.